# Patient Record
Sex: FEMALE | Race: BLACK OR AFRICAN AMERICAN | Employment: UNEMPLOYED | URBAN - METROPOLITAN AREA
[De-identification: names, ages, dates, MRNs, and addresses within clinical notes are randomized per-mention and may not be internally consistent; named-entity substitution may affect disease eponyms.]

---

## 2020-05-03 ENCOUNTER — HOSPITAL ENCOUNTER (EMERGENCY)
Age: 30
Discharge: HOME | End: 2020-05-03
Attending: EMERGENCY MEDICINE | Admitting: EMERGENCY MEDICINE
Payer: COMMERCIAL

## 2020-05-03 ENCOUNTER — APPOINTMENT (OUTPATIENT)
Dept: CT IMAGING | Age: 30
End: 2020-05-03
Attending: EMERGENCY MEDICINE
Payer: COMMERCIAL

## 2020-05-03 VITALS
RESPIRATION RATE: 16 BRPM | HEART RATE: 78 BPM | TEMPERATURE: 98.5 F | SYSTOLIC BLOOD PRESSURE: 165 MMHG | DIASTOLIC BLOOD PRESSURE: 76 MMHG | OXYGEN SATURATION: 100 %

## 2020-05-03 DIAGNOSIS — K80.20 GALL STONES: ICD-10-CM

## 2020-05-03 DIAGNOSIS — K80.50 BILIARY COLIC: Primary | ICD-10-CM

## 2020-05-03 DIAGNOSIS — N83.202 CYST OF LEFT OVARY: ICD-10-CM

## 2020-05-03 LAB
ALBUMIN SERPL-MCNC: 3.2 G/DL (ref 3.5–5)
ALBUMIN/GLOB SERPL: 0.6 {RATIO} (ref 1.1–2.2)
ALP SERPL-CCNC: 67 U/L (ref 45–117)
ALT SERPL-CCNC: 25 U/L (ref 12–78)
ANION GAP SERPL CALC-SCNC: 5 MMOL/L (ref 5–15)
APPEARANCE UR: CLEAR
AST SERPL-CCNC: 14 U/L (ref 15–37)
BACTERIA URNS QL MICRO: NEGATIVE /HPF
BASOPHILS # BLD: 0 K/UL (ref 0–0.1)
BASOPHILS NFR BLD: 0 % (ref 0–1)
BILIRUB SERPL-MCNC: 0.3 MG/DL (ref 0.2–1)
BILIRUB UR QL: NEGATIVE
BUN SERPL-MCNC: 8 MG/DL (ref 6–20)
BUN/CREAT SERPL: 11 (ref 12–20)
CALCIUM SERPL-MCNC: 8.7 MG/DL (ref 8.5–10.1)
CHLORIDE SERPL-SCNC: 102 MMOL/L (ref 97–108)
CO2 SERPL-SCNC: 26 MMOL/L (ref 21–32)
COLOR UR: ABNORMAL
COMMENT, HOLDF: NORMAL
CREAT SERPL-MCNC: 0.73 MG/DL (ref 0.55–1.02)
DIFFERENTIAL METHOD BLD: ABNORMAL
EOSINOPHIL # BLD: 0 K/UL (ref 0–0.4)
EOSINOPHIL NFR BLD: 0 % (ref 0–7)
EPITH CASTS URNS QL MICRO: ABNORMAL /LPF
ERYTHROCYTE [DISTWIDTH] IN BLOOD BY AUTOMATED COUNT: 13.5 % (ref 11.5–14.5)
GLOBULIN SER CALC-MCNC: 5.2 G/DL (ref 2–4)
GLUCOSE SERPL-MCNC: 119 MG/DL (ref 65–100)
GLUCOSE UR STRIP.AUTO-MCNC: NEGATIVE MG/DL
HCG UR QL: NEGATIVE
HCT VFR BLD AUTO: 32.8 % (ref 35–47)
HGB BLD-MCNC: 10.4 G/DL (ref 11.5–16)
HGB UR QL STRIP: NEGATIVE
HYALINE CASTS URNS QL MICRO: ABNORMAL /LPF (ref 0–5)
IMM GRANULOCYTES # BLD AUTO: 0 K/UL (ref 0–0.04)
IMM GRANULOCYTES NFR BLD AUTO: 0 % (ref 0–0.5)
KETONES UR QL STRIP.AUTO: ABNORMAL MG/DL
LEUKOCYTE ESTERASE UR QL STRIP.AUTO: NEGATIVE
LIPASE SERPL-CCNC: 88 U/L (ref 73–393)
LYMPHOCYTES # BLD: 1.2 K/UL (ref 0.8–3.5)
LYMPHOCYTES NFR BLD: 15 % (ref 12–49)
MCH RBC QN AUTO: 27.4 PG (ref 26–34)
MCHC RBC AUTO-ENTMCNC: 31.7 G/DL (ref 30–36.5)
MCV RBC AUTO: 86.3 FL (ref 80–99)
MONOCYTES # BLD: 0.3 K/UL (ref 0–1)
MONOCYTES NFR BLD: 4 % (ref 5–13)
NEUTS SEG # BLD: 6.6 K/UL (ref 1.8–8)
NEUTS SEG NFR BLD: 81 % (ref 32–75)
NITRITE UR QL STRIP.AUTO: NEGATIVE
NRBC # BLD: 0 K/UL (ref 0–0.01)
NRBC BLD-RTO: 0 PER 100 WBC
PH UR STRIP: >8.5 [PH] (ref 5–8)
PLATELET # BLD AUTO: 290 K/UL (ref 150–400)
PMV BLD AUTO: 9.9 FL (ref 8.9–12.9)
POTASSIUM SERPL-SCNC: 3.6 MMOL/L (ref 3.5–5.1)
PROT SERPL-MCNC: 8.4 G/DL (ref 6.4–8.2)
PROT UR STRIP-MCNC: 30 MG/DL
RBC # BLD AUTO: 3.8 M/UL (ref 3.8–5.2)
RBC #/AREA URNS HPF: ABNORMAL /HPF (ref 0–5)
SAMPLES BEING HELD,HOLD: NORMAL
SODIUM SERPL-SCNC: 133 MMOL/L (ref 136–145)
SP GR UR REFRACTOMETRY: 1.03 (ref 1–1.03)
UR CULT HOLD, URHOLD: NORMAL
UROBILINOGEN UR QL STRIP.AUTO: 1 EU/DL (ref 0.2–1)
WBC # BLD AUTO: 8.2 K/UL (ref 3.6–11)
WBC URNS QL MICRO: ABNORMAL /HPF (ref 0–4)

## 2020-05-03 PROCEDURE — 96374 THER/PROPH/DIAG INJ IV PUSH: CPT

## 2020-05-03 PROCEDURE — 36415 COLL VENOUS BLD VENIPUNCTURE: CPT

## 2020-05-03 PROCEDURE — 74011250637 HC RX REV CODE- 250/637: Performed by: EMERGENCY MEDICINE

## 2020-05-03 PROCEDURE — 99284 EMERGENCY DEPT VISIT MOD MDM: CPT

## 2020-05-03 PROCEDURE — 74011250636 HC RX REV CODE- 250/636: Performed by: EMERGENCY MEDICINE

## 2020-05-03 PROCEDURE — 74011000258 HC RX REV CODE- 258: Performed by: RADIOLOGY

## 2020-05-03 PROCEDURE — 96376 TX/PRO/DX INJ SAME DRUG ADON: CPT

## 2020-05-03 PROCEDURE — 74177 CT ABD & PELVIS W/CONTRAST: CPT

## 2020-05-03 PROCEDURE — 80053 COMPREHEN METABOLIC PANEL: CPT

## 2020-05-03 PROCEDURE — 85025 COMPLETE CBC W/AUTO DIFF WBC: CPT

## 2020-05-03 PROCEDURE — 74011636320 HC RX REV CODE- 636/320: Performed by: RADIOLOGY

## 2020-05-03 PROCEDURE — 81025 URINE PREGNANCY TEST: CPT

## 2020-05-03 PROCEDURE — 81001 URINALYSIS AUTO W/SCOPE: CPT

## 2020-05-03 PROCEDURE — 96375 TX/PRO/DX INJ NEW DRUG ADDON: CPT

## 2020-05-03 PROCEDURE — 83690 ASSAY OF LIPASE: CPT

## 2020-05-03 RX ORDER — ONDANSETRON 4 MG/1
4 TABLET, ORALLY DISINTEGRATING ORAL
Qty: 20 TAB | Refills: 0 | Status: SHIPPED | OUTPATIENT
Start: 2020-05-03

## 2020-05-03 RX ORDER — HYDROCODONE BITARTRATE AND ACETAMINOPHEN 5; 325 MG/1; MG/1
1 TABLET ORAL
Qty: 18 TAB | Refills: 0 | Status: SHIPPED | OUTPATIENT
Start: 2020-05-03 | End: 2020-05-06

## 2020-05-03 RX ORDER — SODIUM CHLORIDE 0.9 % (FLUSH) 0.9 %
10 SYRINGE (ML) INJECTION
Status: COMPLETED | OUTPATIENT
Start: 2020-05-03 | End: 2020-05-03

## 2020-05-03 RX ORDER — ONDANSETRON 2 MG/ML
4 INJECTION INTRAMUSCULAR; INTRAVENOUS
Status: COMPLETED | OUTPATIENT
Start: 2020-05-03 | End: 2020-05-03

## 2020-05-03 RX ORDER — SODIUM CHLORIDE 0.9 % (FLUSH) 0.9 %
5-40 SYRINGE (ML) INJECTION EVERY 8 HOURS
Status: DISCONTINUED | OUTPATIENT
Start: 2020-05-03 | End: 2020-05-03 | Stop reason: HOSPADM

## 2020-05-03 RX ORDER — SODIUM CHLORIDE 0.9 % (FLUSH) 0.9 %
5-40 SYRINGE (ML) INJECTION AS NEEDED
Status: DISCONTINUED | OUTPATIENT
Start: 2020-05-03 | End: 2020-05-03 | Stop reason: HOSPADM

## 2020-05-03 RX ORDER — HYDROCODONE BITARTRATE AND ACETAMINOPHEN 5; 325 MG/1; MG/1
1 TABLET ORAL ONCE
Status: COMPLETED | OUTPATIENT
Start: 2020-05-03 | End: 2020-05-03

## 2020-05-03 RX ORDER — FENTANYL CITRATE 50 UG/ML
50 INJECTION, SOLUTION INTRAMUSCULAR; INTRAVENOUS ONCE
Status: COMPLETED | OUTPATIENT
Start: 2020-05-03 | End: 2020-05-03

## 2020-05-03 RX ADMIN — SODIUM CHLORIDE 100 ML: 900 INJECTION, SOLUTION INTRAVENOUS at 07:18

## 2020-05-03 RX ADMIN — IOPAMIDOL 100 ML: 755 INJECTION, SOLUTION INTRAVENOUS at 07:18

## 2020-05-03 RX ADMIN — HYDROCODONE BITARTRATE AND ACETAMINOPHEN 1 TABLET: 5; 325 TABLET ORAL at 08:26

## 2020-05-03 RX ADMIN — ONDANSETRON 4 MG: 2 INJECTION INTRAMUSCULAR; INTRAVENOUS at 06:47

## 2020-05-03 RX ADMIN — ONDANSETRON 4 MG: 2 INJECTION INTRAMUSCULAR; INTRAVENOUS at 08:26

## 2020-05-03 RX ADMIN — Medication 10 ML: at 07:18

## 2020-05-03 RX ADMIN — FENTANYL CITRATE 50 MCG: 50 INJECTION INTRAMUSCULAR; INTRAVENOUS at 06:47

## 2020-05-03 NOTE — ED TRIAGE NOTES
Triage: Pt arrives from home with CC of abdominal pain in the center of her stomach. She reports the pain is 10/10 and describes it as a knot. Pt endorses some nausea and vomiting. Pt also reports that she has hasn't had her period in over a month and could potentially be pregnant. Pt took tylenol and ibuprofen for pain without relief. Denies fever and cough.

## 2020-05-03 NOTE — ED PROVIDER NOTES
Healthy. She presents with an 8-hour history of diffuse abdominal pain that is worse in the epigastrium. It has been constant but waxes and wanes in intensity. She states that it radiates somewhat to her back. She has had similar but milder pain in the past that has lasted only for a couple hours at a time. She has had nausea and vomiting x2. No diarrhea. She states that her last bowel movement was last night. No black stools or bright red blood per rectum. She tried Tylenol, ibuprofen, and Pepto-Bismol without relief. She states that her only surgical history is 2 C-sections in the past.           No past medical history on file. No past surgical history on file. No family history on file.     Social History     Socioeconomic History    Marital status:      Spouse name: Not on file    Number of children: Not on file    Years of education: Not on file    Highest education level: Not on file   Occupational History    Not on file   Social Needs    Financial resource strain: Not on file    Food insecurity     Worry: Not on file     Inability: Not on file    Transportation needs     Medical: Not on file     Non-medical: Not on file   Tobacco Use    Smoking status: Not on file   Substance and Sexual Activity    Alcohol use: Not on file    Drug use: Not on file    Sexual activity: Not on file   Lifestyle    Physical activity     Days per week: Not on file     Minutes per session: Not on file    Stress: Not on file   Relationships    Social connections     Talks on phone: Not on file     Gets together: Not on file     Attends Mandaeism service: Not on file     Active member of club or organization: Not on file     Attends meetings of clubs or organizations: Not on file     Relationship status: Not on file    Intimate partner violence     Fear of current or ex partner: Not on file     Emotionally abused: Not on file     Physically abused: Not on file     Forced sexual activity: Not on file   Other Topics Concern    Not on file   Social History Narrative    Not on file         ALLERGIES: Patient has no known allergies. Review of Systems   All other systems reviewed and are negative. Vitals:    05/03/20 0533   BP: 156/64   Pulse: 78   Resp: 16   Temp: 97.9 °F (36.6 °C)   SpO2: 99%            Physical Exam  Vitals signs and nursing note reviewed. Constitutional:       Appearance: She is well-developed. Comments: Obese. Appears uncomfortable. HENT:      Head: Normocephalic and atraumatic. Eyes:      Conjunctiva/sclera: Conjunctivae normal.   Neck:      Musculoskeletal: Neck supple. Trachea: No tracheal deviation. Cardiovascular:      Rate and Rhythm: Normal rate and regular rhythm. Heart sounds: Normal heart sounds. No murmur. No friction rub. No gallop. Pulmonary:      Effort: Pulmonary effort is normal.      Breath sounds: Normal breath sounds. Abdominal:      Palpations: Abdomen is soft. Comments: Epigastric tenderness. Musculoskeletal:         General: No deformity. Skin:     General: Skin is warm and dry. Neurological:      Mental Status: She is alert. Comments: oriented          MDM       Procedures    Assessment/plan: Presents with an 8-hour history of diffuse abdominal pain (worse in the epigastrium). Differential diagnosis includes GERD, gastritis, biliary colic, small bowel obstruction, pancreatitis. I have ordered labs, CT, IV fluids, fentanyl, and Zofran. Delvin Kennedy MD  6:37 AM    7:05 AM  Change of shift. Care of patient signed over to Dr. Emily Segura. Handoff complete. Awaiting CT/reassessment.    Delvin Kennedy MD

## 2020-05-03 NOTE — ED NOTES
Discharge instructions and prescriptions x2 reviewed with patient. Circumstances to return to ED and follow up reviewed. Patient verbalized understanding. Patient AAO x4, ambulatory to waiting room. Patient stable and safe for discharge.

## 2020-05-03 NOTE — ED NOTES
Care assumed from Dr. Encarnacion Core at 7 AM.  Please see his note for full H&P.  49-year-old female with epigastric abdominal pain that reportedly has been waxing and waning intermittently but returned more severe last night. Patient is afebrile with vital signs stable but uncomfortable appearing, reportedly. Labs returned showing no significant abnormalities. UA negative for infection or hematuria. CT abdomen pelvis pending at time of signout. CT returned showing cholelithiasis. Feel this is likely the etiology for her symptoms with biliary colic. Also noted to have a 5.6 cm left ovarian cyst which she was notified of and recommended OB/GYN follow-up for. No pain lower abdomen currently. She was given a dose of Norco and Zofran in the ED prior to discharge for symptomatic relief and was Rx the same. She was recommended close follow-up with general surgery for further evaluation of her symptoms and to arrange for likely outpatient cholecystectomy. She was advised low-fat diet. Return precautions were given for worsening or concerns with specific emphasis placed on signs of infection.

## 2020-05-04 ENCOUNTER — PATIENT OUTREACH (OUTPATIENT)
Dept: CASE MANAGEMENT | Age: 30
End: 2020-05-04

## 2020-05-04 ENCOUNTER — OFFICE VISIT (OUTPATIENT)
Dept: SURGERY | Age: 30
End: 2020-05-04

## 2020-05-04 VITALS
WEIGHT: 293 LBS | BODY MASS INDEX: 47.09 KG/M2 | RESPIRATION RATE: 20 BRPM | HEIGHT: 66 IN | DIASTOLIC BLOOD PRESSURE: 84 MMHG | OXYGEN SATURATION: 94 % | HEART RATE: 113 BPM | SYSTOLIC BLOOD PRESSURE: 133 MMHG | TEMPERATURE: 98.5 F

## 2020-05-04 DIAGNOSIS — K80.20 CALCULUS OF GALLBLADDER WITHOUT CHOLECYSTITIS WITHOUT OBSTRUCTION: Primary | ICD-10-CM

## 2020-05-04 PROBLEM — E66.01 OBESITY, MORBID (HCC): Status: ACTIVE | Noted: 2020-05-04

## 2020-05-04 NOTE — PROGRESS NOTES
Patient contacted regarding recent discharge and COVID-19 risk   Care Transition Nurse/ Ambulatory Care Manager contacted the patient by telephone to perform post discharge assessment. Verified name and  with patient as identifiers. Patient has following risk factors of: no known risk factors. CTN/ACM reviewed discharge instructions, medical action plan and red flags related to discharge diagnosis. Reviewed and educated them on any new and changed medications related to discharge diagnosis. Advised obtaining a 90-day supply of all daily and as-needed medications. Education provided regarding infection prevention, and signs and symptoms of COVID-19 and when to seek medical attention with patient who verbalized understanding. Discussed exposure protocols and quarantine from [address] you at higher risk for severe illness  and given an opportunity for questions and concerns. The patient agrees to contact the COVID-19 hotline [phone] or PCP office for questions related to their healthcare. CTN/ACM provided contact information for future reference. From CDC: Are you at higher risk for severe illness?  Wash your hands often.  Avoid close contact (6 feet, which is about two arm lengths) with people who are sick.  Put distance between yourself and other people if COVID-19 is spreading in your community.  Clean and disinfect frequently touched surfaces.  Avoid all cruise travel and non-essential air travel.  Call your healthcare professional if you have concerns about COVID-19 and your underlying condition or if you are sick.     For more information on steps you can take to protect yourself, see CDC's How to Protect Yourself      Patient/family/caregiver given information for [facility] and agrees to enroll no  Patient's preferred e-mail:  declined  Patient's preferred phone number: declined  Based on Loop alert triggers, patient will be contacted by nurse care manager for worsening symptoms. Plan for follow-up call in 7-14 days based on severity of symptoms and risk factors. Patient reports having ongoing pain in abdomen and nausea. Patient reports no further vomiting since dc'd home but she has only eaten a few crackers and has been drinking juice and water. Patient reports taking the pain medication every 4 hours and still having a lot of pain and unable to sleep longer than 15-20 mins at a time due to pain. Patient was able to schedule with Dr Georgia Nielsen today at 3:40 pm. Patient with questions about the gallbladder, provided that it is a relatively simple surgery usually done laparoscopically with a relatively quick recovery time. Patient notes concern over ovarian cyst and whether it will need to be removed. Recommended she could mention to Dr Georgia Nielsen and request his opinion at appt but will most likely need to follow up with GYN regarding this. At this time the primary issue to address would be the gallstones. COVID teaching provided. All questions answered.

## 2020-05-04 NOTE — PROGRESS NOTES
Kimberly Morillo is a 30 y.o. female who is referred by the [facility] ER for further evaluation of symptomatic cholelithiasis. Ms. Carmelita Phelps tells me that she began experiencing abdominal pain on May 3, 2020. Pain localized to epigastrium. No clear relationship between abdominal pain and meals. Associated nausea and vomitting. No hematemesis or coffee ground emesis. No fevers but she did experience chills. No clear h/o clarisse colored stool or tea colored urine. Ms. Carmelita Phelps also reports chest pain and shortness of breath. She has otherwise been in her usual state of health. CT scan abdomen/pelvis with IV contrast - 5/3/2020 - Cholelithiasis. No acute abnormality. 5.6 cm left ovarian cyst.    Past Medical History:   Diagnosis Date    Calculus of gallbladder without cholecystitis without obstruction 5/4/2020     History reviewed. No pertinent surgical history. History reviewed. No pertinent family history. Social History     Socioeconomic History    Marital status: UNKNOWN     Spouse name: Not on file    Number of children: Not on file    Years of education: Not on file    Highest education level: Not on file     Review of systems negative except as noted. Review of Systems   Respiratory: Negative for shortness of breath. Cardiovascular: Negative for chest pain. Gastrointestinal: Positive for abdominal pain, nausea and vomiting. No clear h/o clarisse colored stool. Genitourinary:        No clear h/o tea colored urine. Physical Exam  Vitals signs reviewed. Constitutional:       General: She is not in acute distress. Appearance: She is obese. HENT:      Head: Normocephalic and atraumatic. Eyes:      General: No scleral icterus. Neck:      Musculoskeletal: Neck supple. Cardiovascular:      Rate and Rhythm: Normal rate and regular rhythm. Pulmonary:      Effort: Pulmonary effort is normal.      Breath sounds: Normal breath sounds.    Abdominal:      General: There is no distension. Palpations: Abdomen is soft. Tenderness: There is no abdominal tenderness. There is no guarding or rebound. Musculoskeletal: Normal range of motion. Lymphadenopathy:      Cervical: No cervical adenopathy. Neurological:      General: No focal deficit present. Mental Status: She is alert. ASSESSMENT and PLAN  Reviewed CT scan. Ms. Nadira Perez is a 30 y.o. female with symptomatic cholelithiasis. In view of the findings on History and Physical examination as well as the imaging studies, she should benefit from cholecystectomy. I discussed laparoscopic cholecystectomy and intra-operative cholangiogram with her today including the potential risks of bleeding, infection, injury to the common bile duct and conversion to an open procedure. She understands and wishes to proceed. I have tentatively scheduled Ms. Lackey for laparoscopic cholecystectomy and intra-operative cholangiogram on May 8, 2020 at [facility]. I will keep herovernight for observation and see her back in the office post-operatively. Norco 5/325 as needed for pain. She is agreeable to this plan of action and is most certainly free to contact the office should any questions or concerns arise.

## 2020-05-05 ENCOUNTER — HOSPITAL ENCOUNTER (OUTPATIENT)
Dept: LAB | Age: 30
Discharge: HOME | End: 2020-05-05

## 2020-05-05 ENCOUNTER — HOSPITAL ENCOUNTER (OUTPATIENT)
Dept: PREADMISSION TESTING | Age: 30
Discharge: HOME | End: 2020-05-05
Payer: COMMERCIAL

## 2020-05-05 VITALS
DIASTOLIC BLOOD PRESSURE: 79 MMHG | RESPIRATION RATE: 18 BRPM | TEMPERATURE: 99 F | HEIGHT: 63 IN | HEART RATE: 87 BPM | BODY MASS INDEX: 51.91 KG/M2 | WEIGHT: 293 LBS | SYSTOLIC BLOOD PRESSURE: 121 MMHG

## 2020-05-05 PROCEDURE — 87635 SARS-COV-2 COVID-19 AMP PRB: CPT

## 2020-05-05 RX ORDER — ACETAMINOPHEN 325 MG/1
1000 TABLET ORAL ONCE
Status: CANCELLED | OUTPATIENT
Start: 2020-05-05 | End: 2020-05-05

## 2020-05-05 RX ORDER — BUPIVACAINE HYDROCHLORIDE 2.5 MG/ML
30 INJECTION, SOLUTION EPIDURAL; INFILTRATION; INTRACAUDAL ONCE
Status: CANCELLED | OUTPATIENT
Start: 2020-05-05 | End: 2020-05-05

## 2020-05-05 NOTE — PERIOP NOTES
Preoperative and medications  instructions reviewed with patient. Patient given  2 bottles of CHG soap. Instructions reviewed on use of CHG soap. Patient given SSI infection FAQS sheet Patient was given the opportunity to ask questions on the information provided.

## 2020-05-06 LAB — SARS-COV-2, COV2NT: NOT DETECTED

## 2020-05-07 ENCOUNTER — ANESTHESIA EVENT (OUTPATIENT)
Dept: SURGERY | Age: 30
End: 2020-05-07
Payer: COMMERCIAL

## 2020-05-07 ENCOUNTER — HOSPITAL ENCOUNTER (OUTPATIENT)
Dept: PREADMISSION TESTING | Age: 30
Discharge: HOME | End: 2020-05-07
Payer: COMMERCIAL

## 2020-05-07 LAB
ATRIAL RATE: 65 BPM
CALCULATED P AXIS, ECG09: 19 DEGREES
CALCULATED R AXIS, ECG10: 16 DEGREES
CALCULATED T AXIS, ECG11: 7 DEGREES
DIAGNOSIS, 93000: NORMAL
P-R INTERVAL, ECG05: 170 MS
Q-T INTERVAL, ECG07: 376 MS
QRS DURATION, ECG06: 94 MS
QTC CALCULATION (BEZET), ECG08: 391 MS
VENTRICULAR RATE, ECG03: 65 BPM

## 2020-05-07 PROCEDURE — 93005 ELECTROCARDIOGRAM TRACING: CPT

## 2020-05-08 ENCOUNTER — APPOINTMENT (OUTPATIENT)
Dept: GENERAL RADIOLOGY | Age: 30
End: 2020-05-08
Attending: ANESTHESIOLOGY
Payer: COMMERCIAL

## 2020-05-08 ENCOUNTER — HOSPITAL ENCOUNTER (OUTPATIENT)
Age: 30
Setting detail: OBSERVATION
Discharge: HOME | End: 2020-05-09
Attending: SURGERY | Admitting: SURGERY
Payer: COMMERCIAL

## 2020-05-08 ENCOUNTER — ANESTHESIA (OUTPATIENT)
Dept: SURGERY | Age: 30
End: 2020-05-08
Payer: COMMERCIAL

## 2020-05-08 DIAGNOSIS — K80.20 CALCULUS OF GALLBLADDER WITHOUT CHOLECYSTITIS WITHOUT OBSTRUCTION: Primary | ICD-10-CM

## 2020-05-08 LAB — HCG UR QL: NEGATIVE

## 2020-05-08 PROCEDURE — 77030008684 HC TU ET CUF COVD -B: Performed by: ANESTHESIOLOGY

## 2020-05-08 PROCEDURE — 77030020263 HC SOL INJ SOD CL0.9% LFCR 1000ML: Performed by: SURGERY

## 2020-05-08 PROCEDURE — 77030037032 HC INSRT SCIS CLICKLLINE DISP STOR -B: Performed by: SURGERY

## 2020-05-08 PROCEDURE — 74011000250 HC RX REV CODE- 250: Performed by: NURSE ANESTHETIST, CERTIFIED REGISTERED

## 2020-05-08 PROCEDURE — 77030008771 HC TU NG SALEM SUMP -A: Performed by: ANESTHESIOLOGY

## 2020-05-08 PROCEDURE — 74011250636 HC RX REV CODE- 250/636: Performed by: ANESTHESIOLOGY

## 2020-05-08 PROCEDURE — 99218 HC RM OBSERVATION: CPT

## 2020-05-08 PROCEDURE — 76210000003 HC OR PH I REC 3.5 TO 4 HR: Performed by: SURGERY

## 2020-05-08 PROCEDURE — 76060000037 HC ANESTHESIA 3 TO 3.5 HR: Performed by: SURGERY

## 2020-05-08 PROCEDURE — 74011000250 HC RX REV CODE- 250: Performed by: SURGERY

## 2020-05-08 PROCEDURE — 77030020053 HC ELECTRD LAPSCP COVD -B: Performed by: SURGERY

## 2020-05-08 PROCEDURE — 77030040361 HC SLV COMPR DVT MDII -B: Performed by: SURGERY

## 2020-05-08 PROCEDURE — 77030013079 HC BLNKT BAIR HGGR 3M -A: Performed by: ANESTHESIOLOGY

## 2020-05-08 PROCEDURE — 77030010507 HC ADH SKN DERMBND J&J -B: Performed by: SURGERY

## 2020-05-08 PROCEDURE — 77030008756 HC TU IRR SUC STRY -B: Performed by: SURGERY

## 2020-05-08 PROCEDURE — 77030012770 HC TRCR OPT FX AMR -B: Performed by: SURGERY

## 2020-05-08 PROCEDURE — 77030018875 HC APPL CLP LIG4 J&J -B: Performed by: SURGERY

## 2020-05-08 PROCEDURE — 74011250637 HC RX REV CODE- 250/637: Performed by: SURGERY

## 2020-05-08 PROCEDURE — 51798 US URINE CAPACITY MEASURE: CPT

## 2020-05-08 PROCEDURE — 77030026438 HC STYL ET INTUB CARD -A: Performed by: ANESTHESIOLOGY

## 2020-05-08 PROCEDURE — 77030011640 HC PAD GRND REM COVD -A: Performed by: SURGERY

## 2020-05-08 PROCEDURE — 77030040922 HC BLNKT HYPOTHRM STRY -A

## 2020-05-08 PROCEDURE — 77030020704 HC DISECT ENDOSC BLNT J&J -B: Performed by: SURGERY

## 2020-05-08 PROCEDURE — 77030031139 HC SUT VCRL2 J&J -A: Performed by: SURGERY

## 2020-05-08 PROCEDURE — 74011250636 HC RX REV CODE- 250/636: Performed by: SURGERY

## 2020-05-08 PROCEDURE — 77030008608 HC TRCR ENDOSC SMTH AMR -B: Performed by: SURGERY

## 2020-05-08 PROCEDURE — 77030007955 HC PCH ENDOSC SPEC J&J -B: Performed by: SURGERY

## 2020-05-08 PROCEDURE — 77030027743 HC APPL F/HEMSTAT BARD -B: Performed by: SURGERY

## 2020-05-08 PROCEDURE — 71045 X-RAY EXAM CHEST 1 VIEW: CPT

## 2020-05-08 PROCEDURE — 77030002933 HC SUT MCRYL J&J -A: Performed by: SURGERY

## 2020-05-08 PROCEDURE — 88304 TISSUE EXAM BY PATHOLOGIST: CPT

## 2020-05-08 PROCEDURE — 81025 URINE PREGNANCY TEST: CPT

## 2020-05-08 PROCEDURE — 65410000002 HC RM PRIVATE OB

## 2020-05-08 PROCEDURE — 74011250636 HC RX REV CODE- 250/636: Performed by: NURSE ANESTHETIST, CERTIFIED REGISTERED

## 2020-05-08 PROCEDURE — 76010000133 HC OR TIME 3 TO 3.5 HR: Performed by: SURGERY

## 2020-05-08 PROCEDURE — 77030032060 HC PWDR HEMSTAT ARISTA ASRB 3GM BARD -C: Performed by: SURGERY

## 2020-05-08 PROCEDURE — 77030020829: Performed by: SURGERY

## 2020-05-08 PROCEDURE — 74011000258 HC RX REV CODE- 258: Performed by: SURGERY

## 2020-05-08 RX ORDER — KETOROLAC TROMETHAMINE 30 MG/ML
INJECTION, SOLUTION INTRAMUSCULAR; INTRAVENOUS AS NEEDED
Status: DISCONTINUED | OUTPATIENT
Start: 2020-05-08 | End: 2020-05-08 | Stop reason: HOSPADM

## 2020-05-08 RX ORDER — DEXAMETHASONE SODIUM PHOSPHATE 4 MG/ML
INJECTION, SOLUTION INTRA-ARTICULAR; INTRALESIONAL; INTRAMUSCULAR; INTRAVENOUS; SOFT TISSUE AS NEEDED
Status: DISCONTINUED | OUTPATIENT
Start: 2020-05-08 | End: 2020-05-08 | Stop reason: HOSPADM

## 2020-05-08 RX ORDER — DIPHENHYDRAMINE HYDROCHLORIDE 50 MG/ML
12.5 INJECTION, SOLUTION INTRAMUSCULAR; INTRAVENOUS AS NEEDED
Status: DISCONTINUED | OUTPATIENT
Start: 2020-05-08 | End: 2020-05-08 | Stop reason: HOSPADM

## 2020-05-08 RX ORDER — SODIUM CHLORIDE 0.9 % (FLUSH) 0.9 %
5-40 SYRINGE (ML) INJECTION AS NEEDED
Status: DISCONTINUED | OUTPATIENT
Start: 2020-05-08 | End: 2020-05-09 | Stop reason: HOSPADM

## 2020-05-08 RX ORDER — ACETAMINOPHEN 325 MG/1
650 TABLET ORAL ONCE
Status: DISCONTINUED | OUTPATIENT
Start: 2020-05-08 | End: 2020-05-08 | Stop reason: HOSPADM

## 2020-05-08 RX ORDER — FENTANYL CITRATE 50 UG/ML
25 INJECTION, SOLUTION INTRAMUSCULAR; INTRAVENOUS
Status: COMPLETED | OUTPATIENT
Start: 2020-05-08 | End: 2020-05-08

## 2020-05-08 RX ORDER — ACETAMINOPHEN 500 MG
1000 TABLET ORAL ONCE
Status: COMPLETED | OUTPATIENT
Start: 2020-05-08 | End: 2020-05-08

## 2020-05-08 RX ORDER — ONDANSETRON 2 MG/ML
INJECTION INTRAMUSCULAR; INTRAVENOUS AS NEEDED
Status: DISCONTINUED | OUTPATIENT
Start: 2020-05-08 | End: 2020-05-08 | Stop reason: HOSPADM

## 2020-05-08 RX ORDER — ONDANSETRON 4 MG/1
4 TABLET, ORALLY DISINTEGRATING ORAL
Status: DISCONTINUED | OUTPATIENT
Start: 2020-05-08 | End: 2020-05-09 | Stop reason: HOSPADM

## 2020-05-08 RX ORDER — DIPHENHYDRAMINE HCL 25 MG
25 CAPSULE ORAL
Status: DISCONTINUED | OUTPATIENT
Start: 2020-05-08 | End: 2020-05-09 | Stop reason: HOSPADM

## 2020-05-08 RX ORDER — SODIUM CHLORIDE 0.9 % (FLUSH) 0.9 %
5-40 SYRINGE (ML) INJECTION AS NEEDED
Status: DISCONTINUED | OUTPATIENT
Start: 2020-05-08 | End: 2020-05-08 | Stop reason: HOSPADM

## 2020-05-08 RX ORDER — OXYCODONE HYDROCHLORIDE 5 MG/1
5 TABLET ORAL
Qty: 12 TAB | Refills: 0 | Status: SHIPPED | OUTPATIENT
Start: 2020-05-08 | End: 2020-05-11

## 2020-05-08 RX ORDER — SODIUM CHLORIDE 0.9 % (FLUSH) 0.9 %
5-40 SYRINGE (ML) INJECTION EVERY 8 HOURS
Status: DISCONTINUED | OUTPATIENT
Start: 2020-05-08 | End: 2020-05-08 | Stop reason: HOSPADM

## 2020-05-08 RX ORDER — SODIUM CHLORIDE 0.9 % (FLUSH) 0.9 %
5-40 SYRINGE (ML) INJECTION EVERY 8 HOURS
Status: DISCONTINUED | OUTPATIENT
Start: 2020-05-08 | End: 2020-05-09 | Stop reason: HOSPADM

## 2020-05-08 RX ORDER — SODIUM CHLORIDE, SODIUM LACTATE, POTASSIUM CHLORIDE, CALCIUM CHLORIDE 600; 310; 30; 20 MG/100ML; MG/100ML; MG/100ML; MG/100ML
INJECTION, SOLUTION INTRAVENOUS
Status: DISCONTINUED | OUTPATIENT
Start: 2020-05-08 | End: 2020-05-08 | Stop reason: HOSPADM

## 2020-05-08 RX ORDER — FENTANYL CITRATE 50 UG/ML
50 INJECTION, SOLUTION INTRAMUSCULAR; INTRAVENOUS AS NEEDED
Status: DISCONTINUED | OUTPATIENT
Start: 2020-05-08 | End: 2020-05-08 | Stop reason: HOSPADM

## 2020-05-08 RX ORDER — SUCCINYLCHOLINE CHLORIDE 20 MG/ML
INJECTION INTRAMUSCULAR; INTRAVENOUS AS NEEDED
Status: DISCONTINUED | OUTPATIENT
Start: 2020-05-08 | End: 2020-05-08 | Stop reason: HOSPADM

## 2020-05-08 RX ORDER — MIDAZOLAM HYDROCHLORIDE 1 MG/ML
1 INJECTION, SOLUTION INTRAMUSCULAR; INTRAVENOUS AS NEEDED
Status: DISCONTINUED | OUTPATIENT
Start: 2020-05-08 | End: 2020-05-08 | Stop reason: HOSPADM

## 2020-05-08 RX ORDER — ROCURONIUM BROMIDE 10 MG/ML
INJECTION, SOLUTION INTRAVENOUS AS NEEDED
Status: DISCONTINUED | OUTPATIENT
Start: 2020-05-08 | End: 2020-05-08 | Stop reason: HOSPADM

## 2020-05-08 RX ORDER — ONDANSETRON 2 MG/ML
4 INJECTION INTRAMUSCULAR; INTRAVENOUS AS NEEDED
Status: DISCONTINUED | OUTPATIENT
Start: 2020-05-08 | End: 2020-05-08 | Stop reason: HOSPADM

## 2020-05-08 RX ORDER — SODIUM CHLORIDE 9 MG/ML
25 INJECTION, SOLUTION INTRAVENOUS CONTINUOUS
Status: DISCONTINUED | OUTPATIENT
Start: 2020-05-08 | End: 2020-05-08 | Stop reason: HOSPADM

## 2020-05-08 RX ORDER — SODIUM CHLORIDE, SODIUM LACTATE, POTASSIUM CHLORIDE, CALCIUM CHLORIDE 600; 310; 30; 20 MG/100ML; MG/100ML; MG/100ML; MG/100ML
75 INJECTION, SOLUTION INTRAVENOUS CONTINUOUS
Status: DISCONTINUED | OUTPATIENT
Start: 2020-05-08 | End: 2020-05-08 | Stop reason: HOSPADM

## 2020-05-08 RX ORDER — LIDOCAINE HYDROCHLORIDE 20 MG/ML
INJECTION, SOLUTION EPIDURAL; INFILTRATION; INTRACAUDAL; PERINEURAL AS NEEDED
Status: DISCONTINUED | OUTPATIENT
Start: 2020-05-08 | End: 2020-05-08 | Stop reason: HOSPADM

## 2020-05-08 RX ORDER — MIDAZOLAM HYDROCHLORIDE 1 MG/ML
0.5 INJECTION, SOLUTION INTRAMUSCULAR; INTRAVENOUS
Status: DISCONTINUED | OUTPATIENT
Start: 2020-05-08 | End: 2020-05-08 | Stop reason: HOSPADM

## 2020-05-08 RX ORDER — OXYCODONE HYDROCHLORIDE 5 MG/1
5 TABLET ORAL
Status: DISCONTINUED | OUTPATIENT
Start: 2020-05-08 | End: 2020-05-09 | Stop reason: HOSPADM

## 2020-05-08 RX ORDER — BUPIVACAINE HYDROCHLORIDE 2.5 MG/ML
30 INJECTION, SOLUTION EPIDURAL; INFILTRATION; INTRACAUDAL ONCE
Status: COMPLETED | OUTPATIENT
Start: 2020-05-08 | End: 2020-05-08

## 2020-05-08 RX ORDER — MIDAZOLAM HYDROCHLORIDE 1 MG/ML
INJECTION, SOLUTION INTRAMUSCULAR; INTRAVENOUS AS NEEDED
Status: DISCONTINUED | OUTPATIENT
Start: 2020-05-08 | End: 2020-05-08 | Stop reason: HOSPADM

## 2020-05-08 RX ORDER — FENTANYL CITRATE 50 UG/ML
INJECTION, SOLUTION INTRAMUSCULAR; INTRAVENOUS AS NEEDED
Status: DISCONTINUED | OUTPATIENT
Start: 2020-05-08 | End: 2020-05-08 | Stop reason: HOSPADM

## 2020-05-08 RX ORDER — SODIUM CHLORIDE, SODIUM LACTATE, POTASSIUM CHLORIDE, CALCIUM CHLORIDE 600; 310; 30; 20 MG/100ML; MG/100ML; MG/100ML; MG/100ML
100 INJECTION, SOLUTION INTRAVENOUS CONTINUOUS
Status: DISCONTINUED | OUTPATIENT
Start: 2020-05-08 | End: 2020-05-09 | Stop reason: HOSPADM

## 2020-05-08 RX ORDER — LIDOCAINE HYDROCHLORIDE 10 MG/ML
0.1 INJECTION, SOLUTION EPIDURAL; INFILTRATION; INTRACAUDAL; PERINEURAL AS NEEDED
Status: DISCONTINUED | OUTPATIENT
Start: 2020-05-08 | End: 2020-05-08 | Stop reason: HOSPADM

## 2020-05-08 RX ORDER — MORPHINE SULFATE 10 MG/ML
2 INJECTION, SOLUTION INTRAMUSCULAR; INTRAVENOUS
Status: DISCONTINUED | OUTPATIENT
Start: 2020-05-08 | End: 2020-05-08 | Stop reason: HOSPADM

## 2020-05-08 RX ORDER — ACETAMINOPHEN 500 MG
1000 TABLET ORAL
Status: DISCONTINUED | OUTPATIENT
Start: 2020-05-08 | End: 2020-05-09 | Stop reason: HOSPADM

## 2020-05-08 RX ORDER — HEPARIN SODIUM 5000 [USP'U]/ML
5000 INJECTION, SOLUTION INTRAVENOUS; SUBCUTANEOUS EVERY 8 HOURS
Status: DISCONTINUED | OUTPATIENT
Start: 2020-05-08 | End: 2020-05-09 | Stop reason: HOSPADM

## 2020-05-08 RX ORDER — ROPIVACAINE HYDROCHLORIDE 5 MG/ML
30 INJECTION, SOLUTION EPIDURAL; INFILTRATION; PERINEURAL ONCE
Status: DISCONTINUED | OUTPATIENT
Start: 2020-05-08 | End: 2020-05-08 | Stop reason: HOSPADM

## 2020-05-08 RX ORDER — OXYCODONE HYDROCHLORIDE 5 MG/1
10 TABLET ORAL
Status: DISCONTINUED | OUTPATIENT
Start: 2020-05-08 | End: 2020-05-09 | Stop reason: HOSPADM

## 2020-05-08 RX ORDER — ESMOLOL HYDROCHLORIDE 10 MG/ML
INJECTION INTRAVENOUS AS NEEDED
Status: DISCONTINUED | OUTPATIENT
Start: 2020-05-08 | End: 2020-05-08 | Stop reason: HOSPADM

## 2020-05-08 RX ORDER — HYDROMORPHONE HYDROCHLORIDE 1 MG/ML
0.2 INJECTION, SOLUTION INTRAMUSCULAR; INTRAVENOUS; SUBCUTANEOUS
Status: DISCONTINUED | OUTPATIENT
Start: 2020-05-08 | End: 2020-05-08 | Stop reason: HOSPADM

## 2020-05-08 RX ORDER — SODIUM CHLORIDE, SODIUM LACTATE, POTASSIUM CHLORIDE, CALCIUM CHLORIDE 600; 310; 30; 20 MG/100ML; MG/100ML; MG/100ML; MG/100ML
125 INJECTION, SOLUTION INTRAVENOUS CONTINUOUS
Status: DISCONTINUED | OUTPATIENT
Start: 2020-05-08 | End: 2020-05-08 | Stop reason: HOSPADM

## 2020-05-08 RX ORDER — PROPOFOL 10 MG/ML
INJECTION, EMULSION INTRAVENOUS AS NEEDED
Status: DISCONTINUED | OUTPATIENT
Start: 2020-05-08 | End: 2020-05-08 | Stop reason: HOSPADM

## 2020-05-08 RX ADMIN — ROCURONIUM BROMIDE 30 MG: 10 SOLUTION INTRAVENOUS at 09:28

## 2020-05-08 RX ADMIN — FENTANYL CITRATE 25 MCG: 50 INJECTION INTRAMUSCULAR; INTRAVENOUS at 11:04

## 2020-05-08 RX ADMIN — SODIUM CHLORIDE, POTASSIUM CHLORIDE, SODIUM LACTATE AND CALCIUM CHLORIDE: 600; 310; 30; 20 INJECTION, SOLUTION INTRAVENOUS at 09:16

## 2020-05-08 RX ADMIN — DEXAMETHASONE SODIUM PHOSPHATE 8 MG: 4 INJECTION, SOLUTION INTRAMUSCULAR; INTRAVENOUS at 08:00

## 2020-05-08 RX ADMIN — SODIUM CHLORIDE, SODIUM LACTATE, POTASSIUM CHLORIDE, AND CALCIUM CHLORIDE 100 ML/HR: 600; 310; 30; 20 INJECTION, SOLUTION INTRAVENOUS at 20:24

## 2020-05-08 RX ADMIN — HEPARIN SODIUM 5000 UNITS: 5000 INJECTION INTRAVENOUS; SUBCUTANEOUS at 22:39

## 2020-05-08 RX ADMIN — SODIUM CHLORIDE, SODIUM LACTATE, POTASSIUM CHLORIDE, AND CALCIUM CHLORIDE 100 ML/HR: 600; 310; 30; 20 INJECTION, SOLUTION INTRAVENOUS at 14:26

## 2020-05-08 RX ADMIN — MIDAZOLAM 2 MG: 1 INJECTION INTRAMUSCULAR; INTRAVENOUS at 07:48

## 2020-05-08 RX ADMIN — OXYCODONE 10 MG: 5 TABLET ORAL at 22:39

## 2020-05-08 RX ADMIN — FENTANYL CITRATE 25 MCG: 50 INJECTION INTRAMUSCULAR; INTRAVENOUS at 10:59

## 2020-05-08 RX ADMIN — ESMOLOL HYDROCHLORIDE 30 MG: 10 INJECTION, SOLUTION INTRAVENOUS at 08:37

## 2020-05-08 RX ADMIN — OXYCODONE 10 MG: 5 TABLET ORAL at 17:55

## 2020-05-08 RX ADMIN — Medication 10 ML: at 20:23

## 2020-05-08 RX ADMIN — ACETAMINOPHEN 1000 MG: 500 TABLET ORAL at 06:26

## 2020-05-08 RX ADMIN — SUGAMMADEX 700 MG: 100 INJECTION, SOLUTION INTRAVENOUS at 10:23

## 2020-05-08 RX ADMIN — FENTANYL CITRATE 25 MCG: 50 INJECTION INTRAMUSCULAR; INTRAVENOUS at 11:09

## 2020-05-08 RX ADMIN — MEPERIDINE HYDROCHLORIDE 25 MG: 50 INJECTION INTRAMUSCULAR; INTRAVENOUS; SUBCUTANEOUS at 10:30

## 2020-05-08 RX ADMIN — HYDROMORPHONE HYDROCHLORIDE 0.2 MG: 1 INJECTION, SOLUTION INTRAMUSCULAR; INTRAVENOUS; SUBCUTANEOUS at 11:20

## 2020-05-08 RX ADMIN — SUCCINYLCHOLINE CHLORIDE 300 MG: 20 INJECTION, SOLUTION INTRAMUSCULAR; INTRAVENOUS at 07:56

## 2020-05-08 RX ADMIN — FENTANYL CITRATE 25 MCG: 50 INJECTION INTRAMUSCULAR; INTRAVENOUS at 11:14

## 2020-05-08 RX ADMIN — PROPOFOL 200 MG: 10 INJECTION, EMULSION INTRAVENOUS at 07:56

## 2020-05-08 RX ADMIN — KETOROLAC TROMETHAMINE 30 MG: 30 INJECTION, SOLUTION INTRAMUSCULAR; INTRAVENOUS at 10:13

## 2020-05-08 RX ADMIN — CEFOTETAN DISODIUM 2 G: 2 INJECTION, POWDER, FOR SOLUTION INTRAMUSCULAR; INTRAVENOUS at 07:38

## 2020-05-08 RX ADMIN — HYDROMORPHONE HYDROCHLORIDE 0.2 MG: 1 INJECTION, SOLUTION INTRAMUSCULAR; INTRAVENOUS; SUBCUTANEOUS at 14:17

## 2020-05-08 RX ADMIN — HEPARIN SODIUM 5000 UNITS: 5000 INJECTION INTRAVENOUS; SUBCUTANEOUS at 14:33

## 2020-05-08 RX ADMIN — FENTANYL CITRATE 50 MCG: 50 INJECTION, SOLUTION INTRAMUSCULAR; INTRAVENOUS at 08:21

## 2020-05-08 RX ADMIN — HYDROMORPHONE HYDROCHLORIDE 0.2 MG: 1 INJECTION, SOLUTION INTRAMUSCULAR; INTRAVENOUS; SUBCUTANEOUS at 12:44

## 2020-05-08 RX ADMIN — FENTANYL CITRATE 100 MCG: 50 INJECTION, SOLUTION INTRAMUSCULAR; INTRAVENOUS at 07:56

## 2020-05-08 RX ADMIN — ACETAMINOPHEN 1000 MG: 500 TABLET, FILM COATED ORAL at 20:28

## 2020-05-08 RX ADMIN — FENTANYL CITRATE 50 MCG: 50 INJECTION, SOLUTION INTRAMUSCULAR; INTRAVENOUS at 08:29

## 2020-05-08 RX ADMIN — HYDROMORPHONE HYDROCHLORIDE 0.2 MG: 1 INJECTION, SOLUTION INTRAMUSCULAR; INTRAVENOUS; SUBCUTANEOUS at 11:35

## 2020-05-08 RX ADMIN — MIDAZOLAM 2 MG: 1 INJECTION INTRAMUSCULAR; INTRAVENOUS at 07:30

## 2020-05-08 RX ADMIN — ONDANSETRON HYDROCHLORIDE 4 MG: 2 INJECTION, SOLUTION INTRAMUSCULAR; INTRAVENOUS at 10:08

## 2020-05-08 RX ADMIN — ROCURONIUM BROMIDE 40 MG: 10 SOLUTION INTRAVENOUS at 08:04

## 2020-05-08 RX ADMIN — ROCURONIUM BROMIDE 20 MG: 10 SOLUTION INTRAVENOUS at 09:00

## 2020-05-08 RX ADMIN — ONDANSETRON HYDROCHLORIDE 4 MG: 2 INJECTION, SOLUTION INTRAMUSCULAR; INTRAVENOUS at 10:33

## 2020-05-08 RX ADMIN — ROCURONIUM BROMIDE 10 MG: 10 SOLUTION INTRAVENOUS at 07:56

## 2020-05-08 RX ADMIN — SODIUM CHLORIDE, POTASSIUM CHLORIDE, SODIUM LACTATE AND CALCIUM CHLORIDE: 600; 310; 30; 20 INJECTION, SOLUTION INTRAVENOUS at 07:30

## 2020-05-08 RX ADMIN — ROCURONIUM BROMIDE 20 MG: 10 SOLUTION INTRAVENOUS at 09:58

## 2020-05-08 RX ADMIN — HYDROMORPHONE HYDROCHLORIDE 0.2 MG: 1 INJECTION, SOLUTION INTRAMUSCULAR; INTRAVENOUS; SUBCUTANEOUS at 14:33

## 2020-05-08 RX ADMIN — LIDOCAINE HYDROCHLORIDE 100 MG: 20 INJECTION, SOLUTION EPIDURAL; INFILTRATION; INTRACAUDAL; PERINEURAL at 07:56

## 2020-05-08 NOTE — ROUTINE PROCESS
Patient: Corona Persaud MRN: [id number]  SSN: [id number] YOB: 1990  Age: 30 y.o. Sex: female Patient is status post Procedure(s): LAPAROSCOPIC CHOLECYSTECTOMY,(URGENT). Surgeon(s) and Role: 
   Jenaro Kim MD - Primary Sharon Sosa MD - Assisting Local/Dose/Irrigation:  Normal saline used for irrigation Peripheral IV 05/08/20 Left;Posterior Forearm (Active) Site Assessment Clean, dry, & intact 5/8/2020  6:20 AM  
Phlebitis Assessment 0 5/8/2020  6:20 AM  
Infiltration Assessment 0 5/8/2020  6:20 AM  
Dressing Status Clean, dry, & intact; New 5/8/2020  6:20 AM  
Dressing Type Tape;Transparent 5/8/2020  6:20 AM  
Hub Color/Line Status Blue; Infusing 5/8/2020  6:20 AM  
        
Airway - Endotracheal Tube Oral (Active) Dressing/Packing:  Wound Abdomen 6 trocar sites-Dressing Type: Sutures; Topical skin adhesive/glue(Dermabond) (05/08/20 1010) Splint/Cast:  ] Other:  SCDs

## 2020-05-08 NOTE — PERIOP NOTES
DELILAH WAS GIVEN TO THE STERILE FIELD TO BE USED BY MD DURING PROCEDURE  REF: [id number]  LOT: [id number]  EXP: 09-

## 2020-05-08 NOTE — ANESTHESIA POSTPROCEDURE EVALUATION
Post-Anesthesia Evaluation and Assessment    Patient: Anh Spangler MRN: [id number]  SSN: [id number]    YOB: 1990  Age: 30 y.o. Sex: female      I have evaluated the patient and they are stable and ready for discharge from the PACU. Cardiovascular Function/Vital Signs  Visit Vitals  /75   Pulse 94   Temp 36.8 °C (98.3 °F)   Resp 27   Ht 5' 3\" (1.6 m)   Wt (!) 180.6 kg (398 lb 2.4 oz)   SpO2 100%   BMI 70.53 kg/m²       Patient is status post General anesthesia for Procedure(s):  LAPAROSCOPIC CHOLECYSTECTOMY,(URGENT). Nausea/Vomiting: None    Postoperative hydration reviewed and adequate. Pain:  Pain Scale 1: Numeric (0 - 10) (05/08/20 1135)  Pain Intensity 1: 9 (05/08/20 1135)   Managed    Neurological Status:   Neuro (WDL): Within Defined Limits (05/08/20 1155)  Neuro  Neurologic State: Appropriate for age;Drowsy (05/08/20 1155)  Orientation Level: Oriented X4 (05/08/20 1055)  Cognition: Follows commands (05/08/20 1055)  Speech: Clear (05/08/20 1055)  LUE Motor Response: Purposeful (05/08/20 1055)  LLE Motor Response: Purposeful (05/08/20 1055)  RUE Motor Response: Purposeful (05/08/20 1055)  RLE Motor Response: Purposeful (05/08/20 1055)   At baseline    Mental Status, Level of Consciousness: Alert and  oriented to person, place, and time    Pulmonary Status:   O2 Device: Room air (05/08/20 1155)   Adequate oxygenation and airway patent    Complications related to anesthesia: None    Post-anesthesia assessment completed. No concerns    Signed By: Trisha Cox MD     May 8, 2020              Procedure(s):  LAPAROSCOPIC CHOLECYSTECTOMY,(URGENT). general    <BSHSIANPOST>    Vitals Value Taken Time   /84 5/8/2020 12:15 PM   Temp 36.8 °C (98.3 °F) 5/8/2020 10:56 AM   Pulse 94 5/8/2020 12:26 PM   Resp 26 5/8/2020 12:26 PM   SpO2 98 % 5/8/2020 12:26 PM   Vitals shown include unvalidated device data.

## 2020-05-08 NOTE — DISCHARGE INSTRUCTIONS
Patient Discharge Instructions    Kade Juarez / [id number] : 1990    Admitted 2020 Discharged: 2020       · It is important that you take the medication exactly as they are prescribed. · Keep your medication in the bottles provided by the pharmacist and keep a list of the medication names, dosages, and times to be taken in your wallet. · Do not take other medications without consulting your doctor. What to do at Home    Recommended diet: Regular. Recommended activity: No Restrictions. No Driving While Taking Oxycodone. Tylenol 1000mg every 6 hours as needed for pain. Ice pack to abdomen. Oxycodone as needed for severe pain. May Take Shower or Jasper Roxo. If you experience any of the following symptoms Fevers, Chills, Nausea, Vomitting, Redness or Drainage at Surgical Site(s) or Any Other Questions or Concerns Please Call -  [phone]. Follow-up with Dr. Dimitri Pike in 10-14 days. Information obtained by :  I understand that if any problems occur once I am at home I am to contact my physician. I understand and acknowledge receipt of the instructions indicated above. [de-identified] or R.N.'s Signature                                                                  Date/Time                                                                                                                                              Patient or Representative Signature                                                          Date/Time      Patient Education        Cholecystectomy: What to Expect at Home  Your Recovery  After your surgery, it is normal to feel weak and tired for several days after you return home. Your belly may be swollen. If you had laparoscopic surgery, you may also have pain in your shoulder for about 24 hours.   You may have gas or need to burp a lot at first, and a few people get diarrhea. The diarrhea usually goes away in 2 to 4 weeks, but it may last longer. How quickly you recover depends on whether you had a laparoscopic or open surgery. · For a laparoscopic surgery, most people can go back to work or their normal routine in 1 to 2 weeks, but it may take longer, depending on the type of work you do. · For an open surgery, it will probably take 4 to 6 weeks before you get back to your normal routine. This care sheet gives you a general idea about how long it will take for you to recover. However, each person recovers at a different pace. Follow the steps below to get better as quickly as possible. How can you care for yourself at home? Activity    · Rest when you feel tired. Getting enough sleep will help you recover.     · Try to walk each day. Start out by walking a little more than you did the day before. Gradually increase the amount you walk. Walking boosts blood flow and helps prevent pneumonia and constipation.     · For about 2 to 4 weeks, avoid lifting anything that would make you strain. This may include a child, heavy grocery bags and milk containers, a heavy briefcase or backpack, cat litter or dog food bags, or a vacuum .     · Avoid strenuous activities, such as biking, jogging, weightlifting, and aerobic exercise, until your doctor says it is okay.     · You may shower 24 to 48 hours after surgery, if your doctor okays it. Pat the cut (incision) dry. Do not take a bath for the first 2 weeks, or until your doctor tells you it is okay.     · You may drive when you are no longer taking pain medicine and can quickly move your foot from the gas pedal to the brake. You must also be able to sit comfortably for a long period of time, even if you do not plan to go far.  You might get caught in traffic.     · For a laparoscopic surgery, most people can go back to work or their normal routine in 1 to 2 weeks, but it may take longer. For an open surgery, it will probably take 4 to 6 weeks before you get back to your normal routine.     · Your doctor will tell you when you can have sex again.    Diet    · Eat smaller meals more often instead of fewer larger meals. You can eat a normal diet, but avoid eating fatty foods for about 1 month. Fatty foods include hamburger, whole milk, cheese, and many snack foods. If your stomach is upset, try bland, low-fat foods like plain rice, broiled chicken, toast, and yogurt.     · Drink plenty of fluids (unless your doctor tells you not to).   · If you have diarrhea, try avoiding spicy foods, dairy products, fatty foods, and alcohol. You can also watch to see if specific foods cause it, and stop eating them. If the diarrhea continues for more than 2 weeks, talk to your doctor.     · You may notice that your bowel movements are not regular right after your surgery. This is common. Try to avoid constipation and straining with bowel movements. You may want to take a fiber supplement every day. If you have not had a bowel movement after a couple of days, ask your doctor about taking a mild laxative. Medicines    · Your doctor will tell you if and when you can restart your medicines. He or she will also give you instructions about taking any new medicines.     · If you take aspirin or some other blood thinner, ask your doctor if and when to start taking it again. Make sure that you understand exactly what your doctor wants you to do.     · Take pain medicines exactly as directed. ? If the doctor gave you a prescription medicine for pain, take it as prescribed. ? If you are not taking a prescription pain medicine, take an over-the-counter medicine such as acetaminophen (Tylenol), ibuprofen (Advil, Motrin), or naproxen (Aleve). Read and follow all instructions on the label. ? Do not take two or more pain medicines at the same time unless the doctor told you to.  Many pain medicines contain acetaminophen, which is Tylenol. Too much Tylenol can be harmful.     · If you think your pain medicine is making you sick to your stomach:  ? Take your medicine after meals (unless your doctor tells you not to). ? Ask your doctor for a different pain medicine.     · If your doctor prescribed antibiotics, take them as directed. Do not stop taking them just because you feel better. You need to take the full course of antibiotics. Incision care    · If you have strips of tape on the incision, or cut, leave the tape on for a week or until it falls off.     · After 24 to 48 hours, wash the area daily with warm, soapy water, and pat it dry.     · You may have staples to hold the cut together. Keep them dry until your doctor takes them out. This is usually in 7 to 10 days.     · Keep the area clean and dry. You may cover it with a gauze bandage if it weeps or rubs against clothing. Change the bandage every day. Ice    · To reduce swelling and pain, put ice or a cold pack on your belly for 10 to 20 minutes at a time. Do this every 1 to 2 hours. Put a thin cloth between the ice and your skin. Follow-up care is a key part of your treatment and safety. Be sure to make and go to all appointments, and call your doctor if you are having problems. It's also a good idea to know your test results and keep a list of the medicines you take. When should you call for help? Call 911 anytime you think you may need emergency care. For example, call if:    · You passed out (lost consciousness).     · You are short of breath. Kaveh Pop your doctor now or seek immediate medical care if:    · You are sick to your stomach and cannot drink fluids.     · You have pain that does not get better when you take your pain medicine.     · You cannot pass stools or gas.     · You have signs of infection, such as:  ? Increased pain, swelling, warmth, or redness. ? Red streaks leading from the incision. ? Pus draining from the incision. ?  A fever.     · Bright red blood has soaked through the bandage over your incision.     · You have loose stitches, or your incision comes open.     · You have signs of a blood clot in your leg (called a deep vein thrombosis), such as:  ? Pain in your calf, back of knee, thigh, or groin. ? Redness and swelling in your leg or groin.    Watch closely for any changes in your health, and be sure to contact your doctor if you have any problems. Where can you learn more? Go to http://leodan-graciela.info/  Enter F357 in the search box to learn more about \"Cholecystectomy: What to Expect at Home. \"  Current as of: August 11, 2019Content Version: 12.4  © 1318-0213 [employer]. Care instructions adapted under license by [employer] (which disclaims liability or warranty for this information). If you have questions about a medical condition or this instruction, always ask your healthcare professional. [address] any warranty or liability for your use of this information. ______________________________________________________________________    Anesthesia Discharge Instructions    After general anesthesia or intervenous sedation, for 24 hours or while taking prescription Narcotics:  · Limit your activities  · Do not drive or operate hazardous machinery  · If you have not urinated within 8 hours after discharge, please contact your surgeon on call. · Do not make important personal or business decisions  · Do not drink alcoholic beverages    Report the following to your surgeon:  · Excessive pain, swelling, redness or odor of or around the surgical area  · Temperature over 100.5 degrees  · Nausea and vomiting lasting longer than 4 hours or if unable to take medication  · Any signs of decreased circulation or nerve impairment to extremity:  Change in color, persistent numbness, tingling, coldness or increased pain.   · Any questions  TYLENOL GIVEN AT 6:25  TORADOL WAS GIVEN AT 10:13  SUGAMMDEX WAS IN OR. THIS CAN EFFECT THE PERFORMANCE OF BIRTH CONTROL MEDICATIONS. THEREFOR ANOTHER TYPE OF BIRTH CONTROL MUST BE USED UNTIL YOUR NEXT MENSTRAL CYCLE.

## 2020-05-08 NOTE — BRIEF OP NOTE
Brief Postoperative Note    Patient: Dana Fuller  YOB: 1990  MRN: [id number]    Date of Procedure: 5/8/2020     Pre-Op Diagnosis:  Symptomatic Cholelithiasis. Post-Op Diagnosis:  Same. Procedure(s):   Laparoscopic Cholecystectomy. Surgeon(s):  MD Hamlet Moraes MD    Surgical Assistant: None    Anesthesia: General     Estimated Blood Loss (mL): Approx. 60ml. Complications: None    Specimens:   ID Type Source Tests Collected by Time Destination   1 : Gallbladder and lymph node Fresh Gallbladder  Dahlia Nielsen MD 5/8/2020 1006 Pathology        Implants: * No implants in log *    Drains: * No LDAs found *    Findings: Chronic cholecystitis. Critical view obtained.     Electronically Signed by Rachel Mendoza MD on 5/8/2020 at 10:31 AM

## 2020-05-08 NOTE — PERIOP NOTES
PATIENT SAYS SHE DOES NOT FEEL SHE CAN GET UP AND GET DRESSED OR MOVE FROM CAR TO WHEELCHAIR. SHE FEELS SHE NEEDS TO STAY. I HAD DISCUSSED THIS WITH DR. Mara Naidu EARLIER. I NOTIFIED HER  AND TOLD BOTH OF THEM SHE CAN LEAVE LATER TODAY IF SHE FEELS ABLE.

## 2020-05-08 NOTE — ANESTHESIA PREPROCEDURE EVALUATION
Relevant Problems   No relevant active problems       Anesthetic History   No history of anesthetic complications            Review of Systems / Medical History  Patient summary reviewed, nursing notes reviewed and pertinent labs reviewed    Pulmonary  Within defined limits                 Neuro/Psych   Within defined limits           Cardiovascular  Within defined limits                     GI/Hepatic/Renal  Within defined limits              Endo/Other        Morbid obesity     Other Findings              Physical Exam    Airway  Mallampati: II  TM Distance: > 6 cm  Neck ROM: normal range of motion   Mouth opening: Normal     Cardiovascular  Regular rate and rhythm,  S1 and S2 normal,  no murmur, click, rub, or gallop             Dental  No notable dental hx       Pulmonary  Breath sounds clear to auscultation               Abdominal  GI exam deferred       Other Findings            Anesthetic Plan    ASA: 3  Anesthesia type: general          Induction: Intravenous  Anesthetic plan and risks discussed with: Patient

## 2020-05-08 NOTE — PERIOP NOTES
TRANSFER - OUT REPORT:    Verbal report given to FLACO PICKETT(name) on Kimberly Morillo  being transferred to 3 N(unit) for routine post - op       Report consisted of patients Situation, Background, Assessment and   Recommendations(SBAR). Time Pre op antibiotic given:Y  Anesthesia Stop time: N  Hewitt Present on Transfer to floor:N  Order for Hewitt on Chart:N  Discharge Prescriptions with Chart:Y    Information from the following report(s) SBAR was reviewed with the receiving nurse. Opportunity for questions and clarification was provided. Is the patient on 02? NO       L/Min        Other     Is the patient on a monitor? NO    Is the nurse transporting with the patient? YES    Surgical Waiting Area notified of patient's transfer from PACU? NO      The following personal items collected during your admission accompanied patient upon transfer:   Dental Appliance:    Vision:    Hearing Aid:    Jewelry: Jewelry: None  Clothing: Clothing: (clothing to PACU)  Other Valuables:  Other Valuables: Cell Phone, Other (comment)(ID and Insurance Cards)  Valuables sent to safe: Personal Items Sent to Safe: #[id number]

## 2020-05-08 NOTE — PERIOP NOTES
Patient was unable to void on bedpan, refusing to go to bathroom, bedside commode will not take her weighjt. Bladder scanned for 520 cc.  was called for straight cath order. The bladder was emptied for 600 cc. . there was a white discharge in the siomara area.

## 2020-05-08 NOTE — PERIOP NOTES
PATIENT AWAKE NOW AND C/O FEELING LIKE SHE IS NOT GETTING ANY AIR ON HE RIGHT SIDE. BREATH SOUNDS ARE CLEAR THROUGHOUT. HER RESPIRATORY RATE IS STILL HIGH. DR. TARANGO WAS CALLED AND CXR ORDERED.

## 2020-05-08 NOTE — PROGRESS NOTES
TRANSFER - IN REPORT:    Verbal report received from [id number] Alicia N RN(name) on Hamlet Scales  being received from PACU(unit) for routine post - op      Report consisted of patients Situation, Background, Assessment and   Recommendations(SBAR). Information from the following report(s) SBAR, Kardex, OR Summary, Procedure Summary, Intake/Output, MAR, Recent Results and Med Rec Status was reviewed with the receiving nurse. Opportunity for questions and clarification was provided. Assessment completed upon patients arrival to unit and care assumed.

## 2020-05-09 VITALS
DIASTOLIC BLOOD PRESSURE: 81 MMHG | TEMPERATURE: 98.6 F | HEART RATE: 88 BPM | RESPIRATION RATE: 18 BRPM | OXYGEN SATURATION: 99 % | BODY MASS INDEX: 51.91 KG/M2 | HEIGHT: 63 IN | SYSTOLIC BLOOD PRESSURE: 129 MMHG | WEIGHT: 293 LBS

## 2020-05-09 PROCEDURE — 99218 HC RM OBSERVATION: CPT

## 2020-05-09 PROCEDURE — 74011250637 HC RX REV CODE- 250/637: Performed by: SURGERY

## 2020-05-09 PROCEDURE — 74011250636 HC RX REV CODE- 250/636: Performed by: SURGERY

## 2020-05-09 RX ADMIN — OXYCODONE 10 MG: 5 TABLET ORAL at 03:51

## 2020-05-09 RX ADMIN — ACETAMINOPHEN 1000 MG: 500 TABLET, FILM COATED ORAL at 06:46

## 2020-05-09 RX ADMIN — OXYCODONE 10 MG: 5 TABLET ORAL at 10:31

## 2020-05-09 RX ADMIN — HEPARIN SODIUM 5000 UNITS: 5000 INJECTION INTRAVENOUS; SUBCUTANEOUS at 06:46

## 2020-05-09 RX ADMIN — SODIUM CHLORIDE, SODIUM LACTATE, POTASSIUM CHLORIDE, AND CALCIUM CHLORIDE 100 ML/HR: 600; 310; 30; 20 INJECTION, SOLUTION INTRAVENOUS at 03:52

## 2020-05-09 NOTE — PROGRESS NOTES
Progress Note    Patient: Ozzie Whittington MRN: [id number]  SSN: [id number]    YOB: 1990  Age: 30 y.o. Sex: female      Admit Date: 2020    1 Day Post-Op    Procedure:  Procedure(s):  LAPAROSCOPIC CHOLECYSTECTOMY,(URGENT)    Subjective:     Patient complains of some bloating. Mild pain. No nausea or vomiting. Objective:     Visit Vitals  /81 (BP Patient Position: At rest)   Pulse 88   Temp 98.6 °F (37 °C)   Resp 18   Ht 5' 3\" (1.6 m)   Wt (!) 398 lb 2.4 oz (180.6 kg)   SpO2 99%   BMI 70.53 kg/m²       Temp (24hrs), Av.5 °F (36.9 °C), Min:98.2 °F (36.8 °C), Max:99.1 °F (37.3 °C)      Physical Exam:    Gen- alert in NAd  Lungs- CTA  H-RRR  Abd-s/appropriately tender. Incisions intact    Data Review: images and reports reviewed    Lab Review: All lab results for the last 24 hours reviewed. No results found for this or any previous visit (from the past 24 hour(s)). Assessment:     Hospital Problems  Date Reviewed: 2020          Codes Class Noted POA    Cholelithiasis ICD-10-CM: K80.20  ICD-9-CM: 574.20  2020 Unknown              Plan/Recommendations/Medical Decision Making:   S/p Lap octavio. Doing well  OOB and ambulate  Discharge home.

## 2020-05-09 NOTE — PROGRESS NOTES
Bedside and Verbal shift change report given to Angeles Victoria RN (oncoming nurse) by Raymundo Berrios RN (offgoing nurse). Report included SBAR, Kardex, Intake/Output, MAR, Recent Results and Progress of Care. I accept this patient to my care at this time. Any subsequent documentation in this Progress Note is intended to be information adjunct to other components of the patient's electronic medical record. Refer to accompanying timestamp(s) for chronology.

## 2020-05-13 ENCOUNTER — TELEPHONE (OUTPATIENT)
Dept: SURGERY | Age: 30
End: 2020-05-13

## 2020-05-13 NOTE — LETTER
NOTIFICATION OF RETURN TO WORK / SCHOOL 
 
5/13/2020 4:26 PM 
 
Ms. Ozzie Whittington [address] [facility] [address] [place] To Whom It May Concern: 
 
Re:  Osman Fitch Ozzie Whittington was under the care of [facility] from 5/4/2020 to present. She was admitted for surgery 5/8/2020 to 5/9/2020. If there are questions or concerns please have the patient contact our office. Sincerely, Arcelia Quezada MD

## 2020-05-13 NOTE — TELEPHONE ENCOUNTER
Patient called and would like a call back from the nurse. She has some questions regarding her surgery on 5/8/20.

## 2020-05-13 NOTE — DISCHARGE SUMMARY
Physician Discharge Summary     Patient ID:  Anthony Sims  [id number]  30 y.o.  1990    Allergies: Patient has no known allergies. Admit Date: 5/8/2020    Discharge Date: 5/9/2020    * Admission Diagnoses: Cholelithiasis [K80.20]    * Discharge Diagnoses:    Hospital Problems as of 5/9/2020 Date Reviewed: 5/5/2020          Codes Class Noted - Resolved POA    Cholelithiasis ICD-10-CM: K80.20  ICD-9-CM: 574.20  5/8/2020 - Present Unknown               Admission Condition: Good    * Discharge Condition: Good    * Procedures:  Laparoscopic Cholecystectomy - 5/8/2020    * Hospital Course:   Normal hospital course for this procedure. Consults: None    Significant Diagnostic Studies: None    * Disposition: Home    Discharge Medications:   Discharge Medication List as of 5/9/2020  9:20 AM      START taking these medications    Details   oxyCODONE IR (ROXICODONE) 5 mg immediate release tablet Take 1 Tab by mouth every four (4) hours as needed for Pain for up to 3 days. Max Daily Amount: 30 mg., Print, Disp-12 Tab, R-0         CONTINUE these medications which have NOT CHANGED    Details   ondansetron (Zofran ODT) 4 mg disintegrating tablet Take 1 Tab by mouth every eight (8) hours as needed for Nausea or Vomiting., Print, Disp-20 Tab, R-0             * Follow-up Care/Patient Instructions: Activity: Activity as tolerated  Diet: Regular Diet  Wound Care: None needed    Follow-up Information     Follow up With Specialties Details Why Contact Info    None    None ([phone]) Patient stated that they have no PCP      Sahil Hernandez MD General Surgery, Breast Surgery, Oncology Schedule an appointment as soon as possible for a visit in 2 weeks For wound re-check [address] [address]  [phone]          Follow-up tests/labs None.     Signed:  Makeda Sosa MD  5/13/2020  12:13 PM

## 2020-05-14 NOTE — TELEPHONE ENCOUNTER
Patient identified with two patient identifiers. Patient 5 days post lap octavio with complaint that she didn't receive follow up call after discharge from hospital.  Patient also stating her discharge instructions states she was admitted and discharged 5/8/20 and she was discharged 5/9/20. Patient states her  was going to use discharge instructions to show his employer that she was in the hospital for 2 days. Patient questioning why she has 6 lap sites instead of 3-4 and she thought she saw something on Mychart about a hernia but can not re call where. Patient has C/O abd and back pain only with position change, no C/O nausea and vomiting or fever. States incision under breast the glue has come off no C/O redness, swelling, pain , or purulent drainage. She is keep area clean dry and covered. Confirmed with patient she was admitted 5/8 and discharged 5/9 as documented in chart, she can get letter for 's job to update them regarding time off, letter will be emailed to patient per request.  Patient informed op not does not mention repair a hernia and do not see this diagnosis in her problem list so I'm not sure where she saw this. Patient will review mychart. Patient informed she had more lap sites due to current weight they may have needed additional probes/surgical tools to adequately remove her gallbladder. Patient instructed to continue to get up and walk move around, she can use over the ibuprofen and tylenol as needed with food if out of prescription pain medication. She can apply heating pad or warm compress and needed for discomfort when changing positions. Patient states she is concerned about apply heat it may melt glue into her skin. Informed patient she will be fine she should wrap heating pad or compress in towel and never apply directly to skin. Patient will continue to monitor for any signs or symptoms of infection and return all if any other questions or concerns.   Discussed how she can send photos via [employer] if she has any concerns regarding her incisions. Patient in agreement.

## 2020-05-18 ENCOUNTER — TELEPHONE (OUTPATIENT)
Dept: SURGERY | Age: 30
End: 2020-05-18

## 2020-05-18 NOTE — TELEPHONE ENCOUNTER
Patient called stating her one area of her wound is opened still and bleeding. I offered patient virtual for her post op appointment on Thursday and patient stated she thinks she needs to come in instead of doing virtual.  Please advise with scheduling. Please route to the front office pool.

## 2020-05-19 ENCOUNTER — PATIENT OUTREACH (OUTPATIENT)
Dept: CASE MANAGEMENT | Age: 30
End: 2020-05-19

## 2020-05-19 NOTE — PROGRESS NOTES
Patient resolved from Transition of Care episode on 5/19/20  Patient/family has been provided the following resources and education related to COVID-19:                         Signs, symptoms and red flags related to COVID-19            CDC exposure and quarantine guidelines            [facility] exposure contact - [phone]            Contact for their local Department of Health                 Patient currently reports that the following symptoms have improved:  no worsening pain. .    No further outreach scheduled with this CTN/ACM. Episode of Care resolved. Patient has this CTN/ACM contact information if future needs arise. Patient reports post cholecystectomy she is still having \"bad pain in my back\". She also notes that one lap site opened up and she was in contact with the surgeons office and was told to cover it with a bandaid which she has been doing. Notes that drainage is orangish yellow in color and clear it does not look purulent and is not red or swollen. She notes she was concerned yesterday and poured peroxide on it. Recommended she not put any more peroxide on it. Patient reports when eating she feels full faster and is only having 1 bm a day where normally she would have 2. Suggested she discuss her concerns with the NP at her surgical follow up on Thursday. Patient notes understanding no other concerns at this time.

## 2020-05-21 ENCOUNTER — OFFICE VISIT (OUTPATIENT)
Dept: SURGERY | Age: 30
End: 2020-05-21

## 2020-05-21 VITALS
SYSTOLIC BLOOD PRESSURE: 141 MMHG | DIASTOLIC BLOOD PRESSURE: 84 MMHG | WEIGHT: 293 LBS | HEART RATE: 92 BPM | OXYGEN SATURATION: 98 % | TEMPERATURE: 98.4 F | HEIGHT: 63 IN | BODY MASS INDEX: 51.91 KG/M2 | RESPIRATION RATE: 18 BRPM

## 2020-05-21 DIAGNOSIS — Z09 POSTOPERATIVE EXAMINATION: Primary | ICD-10-CM

## 2020-05-21 NOTE — PROGRESS NOTES
Subjective:      Aba Saravia is a 30 y.o. female presents for postop care Laparoscopic Cholecystectomy days following  On 5/8/2020. Appetite is fair. Eating a regular diet without difficulty. Bowel movements are regular. The patient is voiding without difficulty. She complains of some pain. She has been taking Tylenol without much relief. She complains of some soreness at the incision site. Her right sided incision drained a little and she put peroxide on it. She has been keeping a band-aid on the incision. .    Patient has an advanced directive: not on file. Ms. Luis Black has a reminder for a \"due or due soon\" health maintenance. I have asked that she contact her primary care provider for follow-up on this health maintenance. Objective:     Visit Vitals  /84 (BP 1 Location: Left arm, BP Patient Position: Sitting)   Pulse 92   Temp 98.4 °F (36.9 °C) (Oral)   Resp 18   Ht 5' 3\" (1.6 m)   Wt (!) 394 lb (178.7 kg)   SpO2 98%   BMI 69.79 kg/m²       General:  alert, cooperative   Abdomen: soft, bowel sounds active, non-tender   Incision:   healing well, no erythema. Right sided incision with scant amount of serous drainage. Assessment:     Doing well postoperatively. Plan:     Follow up as needed  May take ibuprofen 600 mg every 8 hours as needed for pain. Path reviewed with patient. May increase activity as tolerated. Pt verbalized understanding and questions were answered to the best of my knowledge and ability.

## 2020-05-21 NOTE — PATIENT INSTRUCTIONS
Cholecystectomy: What to Expect at [facility] Your Recovery After your surgery, it is normal to feel weak and tired for several days after you return home. Your belly may be swollen. If you had laparoscopic surgery, you may also have pain in your shoulder for about 24 hours. You may have gas or need to burp a lot at first, and a few people get diarrhea. The diarrhea usually goes away in 2 to 4 weeks, but it may last longer. How quickly you recover depends on whether you had a laparoscopic or open surgery. · For a laparoscopic surgery, most people can go back to work or their normal routine in 1 to 2 weeks, but it may take longer, depending on the type of work you do. · For an open surgery, it will probably take 4 to 6 weeks before you get back to your normal routine. This care sheet gives you a general idea about how long it will take for you to recover. However, each person recovers at a different pace. Follow the steps below to get better as quickly as possible. How can you care for yourself at home? Activity 
  · Rest when you feel tired. Getting enough sleep will help you recover.  
  · Try to walk each day. Start out by walking a little more than you did the day before. Gradually increase the amount you walk. Walking boosts blood flow and helps prevent pneumonia and constipation.  
  · For about 2 to 4 weeks, avoid lifting anything that would make you strain. This may include a child, heavy grocery bags and milk containers, a heavy briefcase or backpack, cat litter or dog food bags, or a vacuum .  
  · Avoid strenuous activities, such as biking, jogging, weightlifting, and aerobic exercise, until your doctor says it is okay.  
  · You may shower 24 to 48 hours after surgery, if your doctor okays it. Pat the cut (incision) dry.  Do not take a bath for the first 2 weeks, or until your doctor tells you it is okay.  
  · You may drive when you are no longer taking pain medicine and can quickly move your foot from the gas pedal to the brake. You must also be able to sit comfortably for a long period of time, even if you do not plan to go far. You might get caught in traffic.  
  · For a laparoscopic surgery, most people can go back to work or their normal routine in 1 to 2 weeks, but it may take longer. For an open surgery, it will probably take 4 to 6 weeks before you get back to your normal routine.  
  · Your doctor will tell you when you can have sex again.  
 Diet 
  · Eat smaller meals more often instead of fewer larger meals. You can eat a normal diet, but avoid eating fatty foods for about 1 month. Fatty foods include hamburger, whole milk, cheese, and many snack foods. If your stomach is upset, try bland, low-fat foods like plain rice, broiled chicken, toast, and yogurt.  
  · Drink plenty of fluids (unless your doctor tells you not to).   · If you have diarrhea, try avoiding spicy foods, dairy products, fatty foods, and alcohol. You can also watch to see if specific foods cause it, and stop eating them. If the diarrhea continues for more than 2 weeks, talk to your doctor.  
  · You may notice that your bowel movements are not regular right after your surgery. This is common. Try to avoid constipation and straining with bowel movements. You may want to take a fiber supplement every day. If you have not had a bowel movement after a couple of days, ask your doctor about taking a mild laxative. Medicines 
  · Your doctor will tell you if and when you can restart your medicines. He or she will also give you instructions about taking any new medicines.  
  · If you take aspirin or some other blood thinner, ask your doctor if and when to start taking it again. Make sure that you understand exactly what your doctor wants you to do.  
  · Take pain medicines exactly as directed. ? If the doctor gave you a prescription medicine for pain, take it as prescribed. ? If you are not taking a prescription pain medicine, take an over-the-counter medicine such as acetaminophen (Tylenol), ibuprofen (Advil, Motrin), or naproxen (Aleve). Read and follow all instructions on the label. ? Do not take two or more pain medicines at the same time unless the doctor told you to. Many pain medicines contain acetaminophen, which is Tylenol. Too much Tylenol can be harmful.  
  · If you think your pain medicine is making you sick to your stomach: 
? Take your medicine after meals (unless your doctor tells you not to). ? Ask your doctor for a different pain medicine.  
  · If your doctor prescribed antibiotics, take them as directed. Do not stop taking them just because you feel better. You need to take the full course of antibiotics. Incision care 
  · If you have strips of tape on the incision, or cut, leave the tape on for a week or until it falls off.  
  · After 24 to 48 hours, wash the area daily with warm, soapy water, and pat it dry.  
  · You may have staples to hold the cut together. Keep them dry until your doctor takes them out. This is usually in 7 to 10 days.  
  · Keep the area clean and dry. You may cover it with a gauze bandage if it weeps or rubs against clothing. Change the bandage every day. Ice 
  · To reduce swelling and pain, put ice or a cold pack on your belly for 10 to 20 minutes at a time. Do this every 1 to 2 hours. Put a thin cloth between the ice and your skin. Follow-up care is a key part of your treatment and safety. Be sure to make and go to all appointments, and call your doctor if you are having problems. It's also a good idea to know your test results and keep a list of the medicines you take. When should you call for help? Call 911 anytime you think you may need emergency care. For example, call if: 
  · You passed out (lost consciousness).  
  · You are short of breath. Cait Iqbaliper your doctor now or seek immediate medical care if:   · You are sick to your stomach and cannot drink fluids.  
  · You have pain that does not get better when you take your pain medicine.  
  · You cannot pass stools or gas.  
  · You have signs of infection, such as: 
? Increased pain, swelling, warmth, or redness. ? Red streaks leading from the incision. ? Pus draining from the incision. ? A fever.  
  · Bright red blood has soaked through the bandage over your incision.  
  · You have loose stitches, or your incision comes open.  
  · You have signs of a blood clot in your leg (called a deep vein thrombosis), such as: 
? Pain in your calf, back of knee, thigh, or groin. ? Redness and swelling in your leg or groin.  
 Watch closely for any changes in your health, and be sure to contact your doctor if you have any problems. Where can you learn more? Go to http://leodan-graciela.info/ Enter 985 56 054 in the search box to learn more about \"Cholecystectomy: What to Expect at Home. \" Current as of: August 11, 2019Content Version: 12.4 © 6945-2176 [employer]. Care instructions adapted under license by [employer] (which disclaims liability or warranty for this information). If you have questions about a medical condition or this instruction, always ask your healthcare professional. [place] [address] any warranty or liability for your use of this information.

## 2020-05-21 NOTE — PROGRESS NOTES
1. Have you been to the ER, urgent care clinic since your last visit? Hospitalized since your last visit? No    2. Have you seen or consulted any other health care providers outside of the [address] [place] [address] since your last visit? Include any pap smears or colon screening.  No

## 2021-03-15 ENCOUNTER — APPOINTMENT (OUTPATIENT)
Dept: GENERAL RADIOLOGY | Age: 31
End: 2021-03-15
Attending: EMERGENCY MEDICINE
Payer: COMMERCIAL

## 2021-03-15 ENCOUNTER — HOSPITAL ENCOUNTER (EMERGENCY)
Age: 31
Discharge: HOME | End: 2021-03-15
Attending: EMERGENCY MEDICINE | Admitting: EMERGENCY MEDICINE
Payer: COMMERCIAL

## 2021-03-15 ENCOUNTER — APPOINTMENT (OUTPATIENT)
Dept: VASCULAR SURGERY | Age: 31
End: 2021-03-15
Attending: EMERGENCY MEDICINE
Payer: COMMERCIAL

## 2021-03-15 VITALS
BODY MASS INDEX: 47.09 KG/M2 | WEIGHT: 293 LBS | OXYGEN SATURATION: 97 % | SYSTOLIC BLOOD PRESSURE: 133 MMHG | RESPIRATION RATE: 16 BRPM | DIASTOLIC BLOOD PRESSURE: 81 MMHG | HEIGHT: 66 IN | TEMPERATURE: 98 F | HEART RATE: 84 BPM

## 2021-03-15 DIAGNOSIS — M25.562 ARTHRALGIA OF LEFT LOWER LEG: Primary | ICD-10-CM

## 2021-03-15 LAB — HCG UR QL: NEGATIVE

## 2021-03-15 PROCEDURE — 93970 EXTREMITY STUDY: CPT

## 2021-03-15 PROCEDURE — 81025 URINE PREGNANCY TEST: CPT

## 2021-03-15 PROCEDURE — 99284 EMERGENCY DEPT VISIT MOD MDM: CPT

## 2021-03-15 PROCEDURE — 73590 X-RAY EXAM OF LOWER LEG: CPT

## 2021-03-15 NOTE — DISCHARGE INSTRUCTIONS
We hope that we have addressed all of your medical concerns. The examination and treatment you received in the Emergency Department were for an emergent problem and were not intended as complete care. It is important that you follow up with your healthcare provider(s) for ongoing care. If your symptoms worsen or do not improve as expected, and you are unable to reach your usual health care provider(s), you should return to the Emergency Department. Today's healthcare is undergoing tremendous change, and patient satisfaction surveys are one of the many tools to assess the quality of medical care. You may receive a survey from the [facility] regarding your experience in the Emergency Department. I hope that your experience has been completely positive, particularly the medical care that I provided. As such, please participate in the survey; anything less than excellent does not meet my expectations or intentions. [facility] and [address] [place] [address] participate in nationally recognized quality of care measures. If your blood pressure is greater than 120/80, as reported below, we urge that you seek medical care to address the potential of high blood pressure, commonly known as hypertension. Hypertension can be hereditary or can be caused by certain medical conditions, pain, stress, or \"white coat syndrome. \"       Please make an appointment with your health care provider(s) for follow up of your Emergency Department visit. VITALS:   Patient Vitals for the past 8 hrs:   Temp Pulse Resp BP SpO2   03/15/21 0234 98.9 °F (37.2 °C) 90 16 138/85 97 %          Thank you for allowing us to provide you with medical care today. We realize that you have many choices for your emergency care needs. Please choose us in the future for any continued health care needs. Ronak Ask  Claudetta Polite, [address] [place] [address] [address]   Office: [phone]            Recent Results (from the past 24 hour(s))   HCG URINE, QL. - POC    Collection Time: 03/15/21  2:52 AM   Result Value Ref Range    Pregnancy test,urine (POC) Negative NEG         Xr Tib/fib Lt    Result Date: 3/15/2021  EXAM: XR TIB/FIB LT INDICATION: swelling, injury. COMPARISON: None. FINDINGS: AP and lateral  views of the left tibia and fibula demonstrate no fracture or other acute osseous, articular or soft tissue abnormality. No acute abnormality.

## 2021-03-15 NOTE — Clinical Note
Tarik Barry 53 
[address] [facility] [address] [id number] [phone] Work/School Note Date: 3/15/2021 To Whom It May concern: 
 
 
Carlos Finch was seen and treated today in the emergency room by the following provider(s): 
Attending Provider: Angie Moran MD. Carlos Finch is excused from work/school on 03/15/21. She is clear to return to work/school on 03/16/21.    
 
 
Sincerely, 
 
 
 
 
Shahnaz Aleman MD

## 2021-03-15 NOTE — ED TRIAGE NOTES
Patient arrives to the ED with c /o pain o her left LE, after falling off of a stool in October/November , + bruising, painful to touch.

## 2021-03-15 NOTE — ED PROVIDER NOTES
HPI   25 yo F presents with left leg pain. Pt state she fell off a stool in October, had a bruise to left lower leg for several months, also with bruise right lower leg but right leg without pain. Pain to right calf, 8/10 at times, 7/10 at this time. Denies fever, chills, swelling, cp, sob, weakness, numbness. Is able to bear weight.   LMP-Feb. 12  Past Medical History:   Diagnosis Date    Calculus of gallbladder without cholecystitis without obstruction 2020    Morbid obesity ([address]     Nausea & vomiting        Past Surgical History:   Procedure Laterality Date    HX  SECTION  2012    HX  SECTION  2016    HX LAP CHOLECYSTECTOMY  2020         Family History:   Problem Relation Age of Onset    Other Mother         SMOKER    Bipolar Disorder Father     Hypertension Father     No Known Problems Sister     No Known Problems Brother     Hypertension Maternal Grandmother     Cataract Maternal Grandmother     No Known Problems Brother     No Known Problems Brother     Anesth Problems Neg Hx        Social History     Socioeconomic History    Marital status: SINGLE     Spouse name: Not on file    Number of children: Not on file    Years of education: Not on file    Highest education level: Not on file   Occupational History    Not on file   Social Needs    Financial resource strain: Not on file    Food insecurity     Worry: Not on file     Inability: Not on file   [employer] needs     Medical: Not on file     Non-medical: Not on file   Tobacco Use    Smoking status: Never Smoker    Smokeless tobacco: Never Used   Substance and Sexual Activity    Alcohol use: Not on file     Comment: RARELY-DRINKS WINE    Drug use: Not Currently     Types: Marijuana     Comment: TEN YEARS AGO    Sexual activity: Not on file   Lifestyle    Physical activity     Days per week: Not on file     Minutes per session: Not on file    Stress: Not on file   Relationships    Social connections     Talks on phone: Not on file     Gets together: Not on file     Attends Holiness service: Not on file     Active member of club or organization: Not on file     Attends meetings of clubs or organizations: Not on file     Relationship status: Not on file    Intimate partner violence     Fear of current or ex partner: Not on file     Emotionally abused: Not on file     Physically abused: Not on file     Forced sexual activity: Not on file   Other Topics Concern    Not on file   Social History Narrative    Not on file         ALLERGIES: Patient has no known allergies. Review of Systems   Constitutional: Negative for chills and fever. Respiratory: Negative for cough and shortness of breath. Cardiovascular: Positive for leg swelling. Negative for chest pain. Gastrointestinal: Negative for nausea and vomiting. Musculoskeletal: Positive for myalgias. Skin: Positive for rash. Neurological: Negative for weakness and numbness. All other systems reviewed and are negative. There were no vitals filed for this visit.          Physical Exam   Physical Examination: General appearance - alert, well appearing, and in no distress, oriented to person, place, and time and normal appearing weight  Eyes - pupils equal and reactive, extraocular eye movements intact  Neck - supple, no significant adenopathy  Chest - clear to auscultation, no wheezes, rales or rhonchi, symmetric air entry  Heart - normal rate, regular rhythm, normal S1, S2, no murmurs, rubs, clicks or gallops  Abdomen - soft, nontender, nondistended, no masses or organomegaly  Back exam - full range of motion, no tenderness, palpable spasm or pain on motion  Neurological - alert, oriented, normal speech, no focal findings or movement disorder noted  Musculoskeletal - no joint tenderness, deformity or swelling  Extremities - peripheral pulses normal, 5 cm linear raised area of bruising to left medial lower leg, NVI with strong pedal pulses, no bony tenderness, is able to ambulate and bear weight  Skin - normal coloration and turgor, no rashes, no suspicious skin lesions noted  MDM  Number of Diagnoses or Management Options     Amount and/or Complexity of Data Reviewed  Tests in the radiology section of CPT®: ordered and reviewed  Independent visualization of images, tracings, or specimens: yes    Patient Progress  Patient progress: stable         Procedures  Imaging unremarkable. No DVT on ultrasound. VSS. F/u with ortho or pcp or return to ED for worsening symptoms.

## 2021-12-17 ENCOUNTER — HOSPITAL ENCOUNTER (EMERGENCY)
Age: 31
Discharge: HOME | End: 2021-12-17
Attending: EMERGENCY MEDICINE
Payer: COMMERCIAL

## 2021-12-17 VITALS
RESPIRATION RATE: 18 BRPM | HEIGHT: 65 IN | HEART RATE: 90 BPM | DIASTOLIC BLOOD PRESSURE: 91 MMHG | OXYGEN SATURATION: 98 % | BODY MASS INDEX: 48.82 KG/M2 | WEIGHT: 293 LBS | TEMPERATURE: 97.7 F | SYSTOLIC BLOOD PRESSURE: 156 MMHG

## 2021-12-17 DIAGNOSIS — R05.9 COUGH: Primary | ICD-10-CM

## 2021-12-17 DIAGNOSIS — Z34.90 EARLY STAGE OF PREGNANCY: ICD-10-CM

## 2021-12-17 DIAGNOSIS — J02.9 SORE THROAT: ICD-10-CM

## 2021-12-17 PROCEDURE — 99282 EMERGENCY DEPT VISIT SF MDM: CPT

## 2021-12-17 RX ORDER — PREDNISONE 20 MG/1
40 TABLET ORAL DAILY
Qty: 10 TABLET | Refills: 0 | Status: SHIPPED | OUTPATIENT
Start: 2021-12-17 | End: 2021-12-22

## 2021-12-17 RX ORDER — ALBUTEROL SULFATE 90 UG/1
2 AEROSOL, METERED RESPIRATORY (INHALATION)
Qty: 1 EACH | Refills: 0 | Status: SHIPPED | OUTPATIENT
Start: 2021-12-17 | End: 2021-12-22

## 2021-12-17 RX ORDER — GUAIFENESIN 600 MG/1
600 TABLET, EXTENDED RELEASE ORAL 2 TIMES DAILY
Qty: 10 TABLET | Refills: 0 | Status: SHIPPED | OUTPATIENT
Start: 2021-12-17 | End: 2021-12-22

## 2021-12-17 NOTE — ED PROVIDER NOTES
22-year-old female presents from home with complaint of cough and sore throat. Patient's been feeling sick for the past few days. Denies any fever at home. She has had some morning sickness because she is 11 weeks pregnant along with some posttussive emesis. She is also had a couple days of diarrhea. Denies any chest pain, abdominal pain. She has been using some over-the-counter cough medications with some relief. No known sick contacts. No other complaints at this time.            Past Medical History:   Diagnosis Date    Calculus of gallbladder without cholecystitis without obstruction 2020    Morbid obesity ([address]     Nausea & vomiting        Past Surgical History:   Procedure Laterality Date    HX  SECTION  2012    HX  SECTION  2016    HX LAP CHOLECYSTECTOMY  2020         Family History:   Problem Relation Age of Onset    Other Mother         SMOKER    Bipolar Disorder Father     Hypertension Father     No Known Problems Sister     No Known Problems Brother     Hypertension Maternal Grandmother     Cataract Maternal Grandmother     No Known Problems Brother     No Known Problems Brother     Anesth Problems Neg Hx        Social History     Socioeconomic History    Marital status: SINGLE     Spouse name: Not on file    Number of children: Not on file    Years of education: Not on file    Highest education level: Not on file   Occupational History    Not on file   Tobacco Use    Smoking status: Never Smoker    Smokeless tobacco: Never Used   Substance and Sexual Activity    Alcohol use: Not on file     Comment: RARELY-DRINKS WINE    Drug use: Not Currently     Types: Marijuana     Comment: TEN YEARS AGO    Sexual activity: Not on file   Other Topics Concern    Not on file   Social History Narrative    Not on file     Social Determinants of Health     Financial Resource Strain:     Difficulty of Paying Living Expenses: Not on file   Food Insecurity:     Worried About Running Out of Food in the Last Year: Not on file    Melinda of Food in the Last Year: Not on file   Transportation Needs:     Lack of Transportation (Medical): Not on file    Lack of Transportation (Non-Medical): Not on file   Physical Activity:     Days of Exercise per Week: Not on file    Minutes of Exercise per Session: Not on file   Stress:     Feeling of Stress : Not on file   Social Connections:     Frequency of Communication with Friends and Family: Not on file    Frequency of Social Gatherings with Friends and Family: Not on file    Attends Mandaeism Services: Not on file    Active Member of [address] [place] [address] [employer] or Organizations: Not on file    Attends Club or Organization Meetings: Not on file    Marital Status: Not on file   Intimate Partner Violence:     Fear of Current or Ex-Partner: Not on file    Emotionally Abused: Not on file    Physically Abused: Not on file    Sexually Abused: Not on file   Housing Stability:     Unable to Pay for Housing in the Last Year: Not on file    Number of [place] in the Last Year: Not on file    Unstable Housing in the Last Year: Not on file         ALLERGIES: Patient has no known allergies. Review of Systems   Constitutional: Negative for fever. HENT: Negative for facial swelling. Eyes: Negative for visual disturbance. Respiratory: Positive for cough. Negative for chest tightness. Cardiovascular: Negative for chest pain. Gastrointestinal: Negative for abdominal pain. Genitourinary: Negative for difficulty urinating and dysuria. Musculoskeletal: Negative for arthralgias. Skin: Negative for rash. Neurological: Negative for headaches. Hematological: Negative for adenopathy. Psychiatric/Behavioral: Negative for suicidal ideas.        Vitals:    12/17/21 1058   BP: (!) 156/91   Pulse: 90   Resp: 18   Temp: 97.7 °F (36.5 °C)   SpO2: 98%   Weight: (!) 181.4 kg (400 lb)   Height: 5' 5\" (1.651 m) Physical Exam  Vitals and nursing note reviewed. Constitutional:       General: She is not in acute distress. Appearance: She is well-developed. HENT:      Head: Normocephalic and atraumatic. Eyes:      General: No scleral icterus. Conjunctiva/sclera: Conjunctivae normal.      Pupils: Pupils are equal, round, and reactive to light. Cardiovascular:      Rate and Rhythm: Normal rate. Heart sounds: No murmur heard. Pulmonary:      Effort: Pulmonary effort is normal. No respiratory distress. Abdominal:      General: There is no distension. Musculoskeletal:         General: Normal range of motion. Cervical back: Normal range of motion and neck supple. Skin:     General: Skin is warm and dry. Findings: No rash. Neurological:      Mental Status: She is alert and oriented to person, place, and time. MDM  Number of Diagnoses or Management Options  Cough  Early stage of pregnancy  Sore throat  Diagnosis management comments: Assessment: Symptoms of URI. Stable for symptomatic management. Discharge home.          Procedures

## 2021-12-17 NOTE — ED TRIAGE NOTES
Pt reports cough and sore throat x1 week. Pt is 11 weeks pregnant. Pt has been using OTC cough medications with no relief.

## 2022-02-02 NOTE — OP NOTES
02/02/22 0918   Social/Functional History   Lives With Alone   Home Layout One level   Home Access Stairs to enter with rails   Entrance Stairs - Number of Steps 2 steps to enter   Entrance Stairs - Rails Both   Bathroom Shower/Tub [employer] Grab bars in shower;Grab bars around toilet; Toilet raiser   Home Equipment Rolling walker   Receives Help From Family   2/2/2022: SS Note/Discharge planning:  Met with pt in [facility] for transition of care planning, pt having an elective total hip arthroplasty today 2/2 , reviewed rehab options for Medicare, pt plans to return home day of surgery from [facility], pt's dtr Silvia Alfred lives close by and will help him and transport him home, pt has dme needed other than a shower seat but declined private pay cost through [facility] and will get one on his own if needed, pt prefers [facility], referral made to [facility] , agency accepted referral and will follow with pt post-op to schedule home therapy visits for start of care tomorrow 2/3. [facility] notified. Electronically signed by Durand Gowers, LSW on 2/2/2022 at 9:17 AM [address] [facility]  OPERATIVE REPORT    Name:  Girish Perrin  MR#:  [id number]  :  1990  ACCOUNT #:  [de-identified]  DATE OF SERVICE:  2020    PREOPERATIVE DIAGNOSIS:  Symptomatic cholelithiasis. POSTOPERATIVE DIAGNOSIS:  Symptomatic cholelithiasis. PROCEDURE PERFORMED:  Laparoscopic cholecystectomy. SURGEON:  Sergio Russell MD    ASSISTANT:  Lindsay Porter MD    ANESTHESIA:  General endotracheal.    COMPLICATIONS:  None. SPECIMENS REMOVED:  Gallbladder to Pathology. IMPLANTS:  None. ESTIMATED BLOOD LOSS:  Approximately 60 mL. IV FLUIDS:  Crystalloid 1500 mL. DRAINS:  None. INDICATIONS FOR SURGERY:  The patient is a 52-year-old female with symptomatic cholelithiasis. Ms. Mario Terry is brought to the operating at this time for laparoscopic cholecystectomy and intraoperative cholangiogram.  The risks of the procedure including but not limited to, infection, bleeding, injury to the common bile duct and conversion to an open procedure were discussed in detail with the patient. Ms. Mario Terry understood and wished to proceed. PROCEDURE:  After consent was obtained, the patient was brought to the operating room where she was placed in the supine position on the operating room table. Following the induction of an adequate level of general anesthesia via the endotracheal tube, compression devices were placed on both lower extremities. The abdomen was prepped with ChloraPrep and draped as a sterile field. Local anesthetic was infiltrated and a small midline incision above the umbilicus was opened sharply. Using the 5-mm non-bladed dilating trocar, access to the peritoneal cavity was achieved under direct vision. After an adequate level of carbon dioxide pneumoperitoneum had been achieved, the laparoscope was inserted. Inspection of the peritoneal contents revealed no focal abnormalities.   Local anesthetic was infiltrated again and a 12-mm trocar and two 5-mm trocars were placed in the usual locations under direct vision. The operating room table was placed in the reverse Trendelenburg position and attention directed towards the gallbladder. Adhesions between the omentum and gallbladder were encountered and these were taken down with a combination of blunt and sharp dissection. The gallbladder was markedly distended and so it was decompressed with the needle aspirator. It should be noted that the bile that was aspirated was not hydropic. The gallbladder was thick-walled consistent with chronic cholecystitis. The gallbladder was grasped and attention directed towards the cystic duct. In the course of identifying the cystic duct, the node of Calot was identified. In order to improve exposure, the lymph node was dissected free, clipped proximally and distally and excised. The specimen was passed off the field and submitted for histopathologic evaluation. In order to improve exposure, local anesthetic was infiltrated and another 5-mm trocar was placed in the right mid abdomen under direct vision. The fan retractor was then placed through this trocar in order to improve exposure. Local anesthetic was infiltrated again and a midline incision several centimeters superior to the umbilicus was opened sharply. A 5-mm trocar was placed under direct vision and the laparoscope was moved to this port. The gallbladder was mobilized with the cautery and the cystic artery was identified. The gallbladder was mobilized further and the cystic duct was identified. This was dissected free circumferentially and followed into the gallbladder. The gallbladder was mobilized further and the critical view was obtained. The cystic artery was then divided between three clips proximally and one distally. Following this, the cystic duct was clipped twice proximally, once distally and divided. Using the hook electrocautery, the gallbladder was dissected off of the liver.   Several bleeders were either clipped or cauterized. The specimen was placed in an Endo Catch bag and placed over the liver. The gallbladder fossa was inspected and the clips on the cystic duct stump and the clips on the cystic artery stump were identified and found to be in place. The gallbladder fossa was irrigated copiously with saline and several bleeders cauterized. 3 g of Gayle were then placed in the gallbladder fossa. The peritoneal cavity was inspected again and felt to be hemostatic. No other abnormalities were noted and so the specimen was removed from the peritoneal cavity, passed off the field and submitted for histopathologic evaluation. The pneumoperitoneum was evacuated and the wounds were irrigated copiously with saline. The fascial defect at the 12-mm trocar site was closed with a 0 Vicryl figure-of-eight suture. This incision was then closed with two layers of running 0 Vicryl suture followed by 4-0 Monocryl subcuticular suture to the skin. The 5-mm trocar sites were closed with 4-0 Monocryl subcuticular suture to the skin. Additional local anesthetic was infiltrated and the incisions were dressed with Dermabond. The patient was awakened from her general anesthetic and extubated in the operating room. She was transferred to her bed and brought to the recovery room in stable condition having tolerated the procedure well. At the conclusion of the procedure, all sponge counts, instrument counts and needle counts were reported as correct x2. Dr. Adry Mcdaniels assistance was required due to the patient's morbid obesity with a BMI of 70.53 kg/sq m.       Kam Mascorro MD      [id number]  D:  05/08/2020 10:40  T:  05/08/2020 11:57  JOB #:  [id number]  CC:  Leone Salon, MD Floyd Primrose, MD

## 2022-03-19 PROBLEM — K80.20 CHOLELITHIASIS: Status: ACTIVE | Noted: 2020-05-08

## 2022-03-19 PROBLEM — E66.01 OBESITY, MORBID: Status: ACTIVE | Noted: 2020-05-04

## 2022-03-19 PROBLEM — K80.20 CALCULUS OF GALLBLADDER WITHOUT CHOLECYSTITIS WITHOUT OBSTRUCTION: Status: ACTIVE | Noted: 2020-05-04

## 2024-09-30 ENCOUNTER — HOSPITAL ENCOUNTER (EMERGENCY)
Facility: HOSPITAL | Age: 34
Discharge: HOME | End: 2024-09-30
Attending: EMERGENCY MEDICINE
Payer: MEDICAID

## 2024-09-30 ENCOUNTER — APPOINTMENT (OUTPATIENT)
Facility: HOSPITAL | Age: 34
End: 2024-09-30
Payer: MEDICAID

## 2024-09-30 VITALS
DIASTOLIC BLOOD PRESSURE: 88 MMHG | RESPIRATION RATE: 26 BRPM | SYSTOLIC BLOOD PRESSURE: 152 MMHG | TEMPERATURE: 98 F | HEART RATE: 84 BPM | OXYGEN SATURATION: 97 %

## 2024-09-30 DIAGNOSIS — K57.32 DIVERTICULITIS OF COLON: Primary | ICD-10-CM

## 2024-09-30 LAB
ALBUMIN SERPL-MCNC: 3 G/DL (ref 3.5–5)
ALBUMIN/GLOB SERPL: 0.6 (ref 1.1–2.2)
ALP SERPL-CCNC: 72 U/L (ref 45–117)
ALT SERPL-CCNC: 26 U/L (ref 12–78)
ANION GAP SERPL CALC-SCNC: 4 MMOL/L (ref 2–12)
APPEARANCE UR: CLEAR
AST SERPL-CCNC: 12 U/L (ref 15–37)
BACTERIA URNS QL MICRO: ABNORMAL /HPF
BASOPHILS # BLD: 0 K/UL (ref 0–0.1)
BASOPHILS NFR BLD: 0 % (ref 0–1)
BILIRUB SERPL-MCNC: 0.3 MG/DL (ref 0.2–1)
BILIRUB UR QL: NEGATIVE
BUN SERPL-MCNC: 8 MG/DL (ref 6–20)
BUN/CREAT SERPL: 11 (ref 12–20)
CALCIUM SERPL-MCNC: 8.9 MG/DL (ref 8.5–10.1)
CHLORIDE SERPL-SCNC: 104 MMOL/L (ref 97–108)
CO2 SERPL-SCNC: 28 MMOL/L (ref 21–32)
COLOR UR: ABNORMAL
COMMENT:: NORMAL
CREAT SERPL-MCNC: 0.74 MG/DL (ref 0.55–1.02)
DIFFERENTIAL METHOD BLD: ABNORMAL
EOSINOPHIL # BLD: 0.1 K/UL (ref 0–0.4)
EOSINOPHIL NFR BLD: 1 % (ref 0–7)
EPITH CASTS URNS QL MICRO: ABNORMAL /LPF
ERYTHROCYTE [DISTWIDTH] IN BLOOD BY AUTOMATED COUNT: 13.9 % (ref 11.5–14.5)
GLOBULIN SER CALC-MCNC: 5 G/DL (ref 2–4)
GLUCOSE SERPL-MCNC: 107 MG/DL (ref 65–100)
GLUCOSE UR STRIP.AUTO-MCNC: NEGATIVE MG/DL
HCG UR QL: NEGATIVE
HCT VFR BLD AUTO: 33.9 % (ref 35–47)
HGB BLD-MCNC: 10.6 G/DL (ref 11.5–16)
HGB UR QL STRIP: NEGATIVE
HYALINE CASTS URNS QL MICRO: ABNORMAL /LPF (ref 0–5)
IMM GRANULOCYTES # BLD AUTO: 0 K/UL (ref 0–0.04)
IMM GRANULOCYTES NFR BLD AUTO: 0 % (ref 0–0.5)
KETONES UR QL STRIP.AUTO: NEGATIVE MG/DL
LEUKOCYTE ESTERASE UR QL STRIP.AUTO: NEGATIVE
LIPASE SERPL-CCNC: 29 U/L (ref 13–75)
LYMPHOCYTES # BLD: 1.7 K/UL (ref 0.8–3.5)
LYMPHOCYTES NFR BLD: 19 % (ref 12–49)
MCH RBC QN AUTO: 26.8 PG (ref 26–34)
MCHC RBC AUTO-ENTMCNC: 31.3 G/DL (ref 30–36.5)
MCV RBC AUTO: 85.8 FL (ref 80–99)
MONOCYTES # BLD: 0.5 K/UL (ref 0–1)
MONOCYTES NFR BLD: 5 % (ref 5–13)
NEUTS SEG # BLD: 6.7 K/UL (ref 1.8–8)
NEUTS SEG NFR BLD: 75 % (ref 32–75)
NITRITE UR QL STRIP.AUTO: NEGATIVE
NRBC # BLD: 0 K/UL (ref 0–0.01)
NRBC BLD-RTO: 0 PER 100 WBC
PH UR STRIP: 6 (ref 5–8)
PLATELET # BLD AUTO: 309 K/UL (ref 150–400)
PMV BLD AUTO: 9.9 FL (ref 8.9–12.9)
POTASSIUM SERPL-SCNC: 3.6 MMOL/L (ref 3.5–5.1)
PROT SERPL-MCNC: 8 G/DL (ref 6.4–8.2)
PROT UR STRIP-MCNC: NEGATIVE MG/DL
RBC # BLD AUTO: 3.95 M/UL (ref 3.8–5.2)
RBC #/AREA URNS HPF: ABNORMAL /HPF (ref 0–5)
SODIUM SERPL-SCNC: 136 MMOL/L (ref 136–145)
SP GR UR REFRACTOMETRY: 1.01 (ref 1–1.03)
SPECIMEN HOLD: NORMAL
SPECIMEN HOLD: NORMAL
UROBILINOGEN UR QL STRIP.AUTO: 0.2 EU/DL (ref 0.2–1)
WBC # BLD AUTO: 9.1 K/UL (ref 3.6–11)
WBC URNS QL MICRO: ABNORMAL /HPF (ref 0–4)

## 2024-09-30 PROCEDURE — 81025 URINE PREGNANCY TEST: CPT

## 2024-09-30 PROCEDURE — 80053 COMPREHEN METABOLIC PANEL: CPT

## 2024-09-30 PROCEDURE — 74177 CT ABD & PELVIS W/CONTRAST: CPT

## 2024-09-30 PROCEDURE — 83690 ASSAY OF LIPASE: CPT

## 2024-09-30 PROCEDURE — 81001 URINALYSIS AUTO W/SCOPE: CPT

## 2024-09-30 PROCEDURE — 6360000004 HC RX CONTRAST MEDICATION: Performed by: RADIOLOGY

## 2024-09-30 PROCEDURE — 85025 COMPLETE CBC W/AUTO DIFF WBC: CPT

## 2024-09-30 PROCEDURE — 2580000003 HC RX 258: Performed by: EMERGENCY MEDICINE

## 2024-09-30 PROCEDURE — 36415 COLL VENOUS BLD VENIPUNCTURE: CPT

## 2024-09-30 PROCEDURE — 99285 EMERGENCY DEPT VISIT HI MDM: CPT

## 2024-09-30 PROCEDURE — 6360000002 HC RX W HCPCS: Performed by: EMERGENCY MEDICINE

## 2024-09-30 PROCEDURE — 96374 THER/PROPH/DIAG INJ IV PUSH: CPT

## 2024-09-30 RX ORDER — 0.9 % SODIUM CHLORIDE 0.9 %
1000 INTRAVENOUS SOLUTION INTRAVENOUS ONCE
Status: COMPLETED | OUTPATIENT
Start: 2024-09-30 | End: 2024-09-30

## 2024-09-30 RX ORDER — IOPAMIDOL 755 MG/ML
100 INJECTION, SOLUTION INTRAVASCULAR
Status: COMPLETED | OUTPATIENT
Start: 2024-09-30 | End: 2024-09-30

## 2024-09-30 RX ORDER — KETOROLAC TROMETHAMINE 30 MG/ML
15 INJECTION, SOLUTION INTRAMUSCULAR; INTRAVENOUS ONCE
Status: COMPLETED | OUTPATIENT
Start: 2024-09-30 | End: 2024-09-30

## 2024-09-30 RX ORDER — DICYCLOMINE HCL 20 MG
20 TABLET ORAL 4 TIMES DAILY PRN
Qty: 20 TABLET | Refills: 0 | Status: SHIPPED | OUTPATIENT
Start: 2024-09-30

## 2024-09-30 RX ADMIN — SODIUM CHLORIDE 1000 ML: 9 INJECTION, SOLUTION INTRAVENOUS at 01:59

## 2024-09-30 RX ADMIN — KETOROLAC TROMETHAMINE 15 MG: 30 INJECTION, SOLUTION INTRAMUSCULAR at 02:11

## 2024-09-30 RX ADMIN — IOPAMIDOL 100 ML: 755 INJECTION, SOLUTION INTRAVENOUS at 04:27

## 2024-09-30 ASSESSMENT — PAIN - FUNCTIONAL ASSESSMENT
PAIN_FUNCTIONAL_ASSESSMENT: ACTIVITIES ARE NOT PREVENTED
PAIN_FUNCTIONAL_ASSESSMENT: 0-10
PAIN_FUNCTIONAL_ASSESSMENT: ACTIVITIES ARE NOT PREVENTED

## 2024-09-30 ASSESSMENT — PAIN DESCRIPTION - LOCATION
LOCATION: ABDOMEN

## 2024-09-30 ASSESSMENT — PAIN SCALES - GENERAL
PAINLEVEL_OUTOF10: 8
PAINLEVEL_OUTOF10: 9
PAINLEVEL_OUTOF10: 2

## 2024-09-30 ASSESSMENT — PAIN DESCRIPTION - ORIENTATION
ORIENTATION: LEFT
ORIENTATION: RIGHT

## 2024-09-30 ASSESSMENT — PAIN DESCRIPTION - DESCRIPTORS
DESCRIPTORS: DULL;DISCOMFORT
DESCRIPTORS: SHARP;SHOOTING;JABBING

## 2024-09-30 ASSESSMENT — PAIN DESCRIPTION - PAIN TYPE: TYPE: ACUTE PAIN

## 2024-09-30 NOTE — ED TRIAGE NOTES
Pt arrives from home with cc of lower abd pain since Friday. She initially thought she had some GI bug but the pain is constant and persistent.     Pain worsens with movement.     Denies urinary symptoms, vaginal bleeding, nausea, vomiting.    Hx of cholecystectomy.

## 2024-09-30 NOTE — ED PROVIDER NOTES
[facility] EMERGENCY DEP  EMERGENCY DEPARTMENT ENCOUNTER      Pt Name: Gab Powell  MRN: [id number]  Birthdate 1990  Date of evaluation: 2024  Provider: Dc Amezquita MD    CHIEF COMPLAINT       Chief Complaint   Patient presents with    Abdominal Pain         HISTORY OF PRESENT ILLNESS   (Location/Symptom, Timing/Onset, Context/Setting, Quality, Duration, Modifying Factors, Severity)  Note limiting factors.   34-year-old with morbid obesity.  She presents with complaints of a 2-day history of abdominal pain.  She states that the pain was across her lower abdomen when it began.  Now it is mostly localized to the left side of her abdomen.  It does not radiate.  It waxes and wanes in intensity.  She denies any urinary symptoms.  No nausea or vomiting.  Her appetite has been decreased, but she has been able to eat and drink.  She has tried Pepto-Bismol, Advil, and ginger ale with limited relief.  She states that her last normal bowel movement was 2 days ago.  She tried drinking coffee to get her bowels to move without success.  She has a history of a cholecystectomy.          Review of External Medical Records:     Nursing Notes were reviewed.    REVIEW OF SYSTEMS    (2-9 systems for level 4, 10 or more for level 5)     Review of Systems    Except as noted above the remainder of the review of systems was reviewed and negative.       PAST MEDICAL HISTORY     Past Medical History:   Diagnosis Date    Calculus of gallbladder without cholecystitis without obstruction 2020    Morbid obesity     Nausea & vomiting          SURGICAL HISTORY       Past Surgical History:   Procedure Laterality Date     SECTION  2012     SECTION  2016    CHOLECYSTECTOMY, LAPAROSCOPIC  2020         CURRENT MEDICATIONS       Previous Medications    No medications on file       ALLERGIES     Patient has no known allergies.    FAMILY HISTORY       Family History   Problem Relation Age of Onset    No

## 2024-09-30 NOTE — ED NOTES
Patient discharged by Bia MANUEL - pt sent to the front lobby, with strong and steady gait, no acute distress noted at time of discharge - Discharge information / home RX / and reasons to return to the ED were reviewed by the ED provider.

## 2024-11-06 ENCOUNTER — APPOINTMENT (OUTPATIENT)
Facility: HOSPITAL | Age: 34
End: 2024-11-06
Payer: MEDICAID

## 2024-11-06 ENCOUNTER — HOSPITAL ENCOUNTER (INPATIENT)
Facility: HOSPITAL | Age: 34
LOS: 2 days | Discharge: HOME | End: 2024-11-09
Attending: STUDENT IN AN ORGANIZED HEALTH CARE EDUCATION/TRAINING PROGRAM | Admitting: FAMILY MEDICINE
Payer: MEDICAID

## 2024-11-06 DIAGNOSIS — J18.9 COMMUNITY ACQUIRED PNEUMONIA OF RIGHT LUNG, UNSPECIFIED PART OF LUNG: Primary | ICD-10-CM

## 2024-11-06 DIAGNOSIS — R09.02 HYPOXIA: ICD-10-CM

## 2024-11-06 LAB
ALBUMIN SERPL-MCNC: 3 G/DL (ref 3.5–5)
ALBUMIN/GLOB SERPL: 0.7 (ref 1.1–2.2)
ALP SERPL-CCNC: 73 U/L (ref 45–117)
ALT SERPL-CCNC: 29 U/L (ref 12–78)
ANION GAP SERPL CALC-SCNC: 7 MMOL/L (ref 2–12)
AST SERPL-CCNC: 29 U/L (ref 15–37)
BASOPHILS # BLD: 0 K/UL (ref 0–0.1)
BASOPHILS NFR BLD: 0 % (ref 0–1)
BILIRUB SERPL-MCNC: 0.5 MG/DL (ref 0.2–1)
BUN SERPL-MCNC: 5 MG/DL (ref 6–20)
BUN/CREAT SERPL: 7 (ref 12–20)
CALCIUM SERPL-MCNC: 8.6 MG/DL (ref 8.5–10.1)
CHLORIDE SERPL-SCNC: 103 MMOL/L (ref 97–108)
CO2 SERPL-SCNC: 27 MMOL/L (ref 21–32)
COMMENT:: NORMAL
CREAT SERPL-MCNC: 0.74 MG/DL (ref 0.55–1.02)
D DIMER PPP FEU-MCNC: 1.23 MG/L FEU (ref 0–0.65)
DIFFERENTIAL METHOD BLD: ABNORMAL
EOSINOPHIL # BLD: 0.1 K/UL (ref 0–0.4)
EOSINOPHIL NFR BLD: 1 % (ref 0–7)
ERYTHROCYTE [DISTWIDTH] IN BLOOD BY AUTOMATED COUNT: 13.9 % (ref 11.5–14.5)
FLUAV RNA SPEC QL NAA+PROBE: NOT DETECTED
FLUBV RNA SPEC QL NAA+PROBE: NOT DETECTED
GLOBULIN SER CALC-MCNC: 4.4 G/DL (ref 2–4)
GLUCOSE SERPL-MCNC: 100 MG/DL (ref 65–100)
HCT VFR BLD AUTO: 34.5 % (ref 35–47)
HGB BLD-MCNC: 11.1 G/DL (ref 11.5–16)
IMM GRANULOCYTES # BLD AUTO: 0.1 K/UL (ref 0–0.04)
IMM GRANULOCYTES NFR BLD AUTO: 1 % (ref 0–0.5)
LYMPHOCYTES # BLD: 0.6 K/UL (ref 0.8–3.5)
LYMPHOCYTES NFR BLD: 9 % (ref 12–49)
MCH RBC QN AUTO: 27.1 PG (ref 26–34)
MCHC RBC AUTO-ENTMCNC: 32.2 G/DL (ref 30–36.5)
MCV RBC AUTO: 84.1 FL (ref 80–99)
MONOCYTES # BLD: 0.3 K/UL (ref 0–1)
MONOCYTES NFR BLD: 5 % (ref 5–13)
NEUTS SEG # BLD: 5.1 K/UL (ref 1.8–8)
NEUTS SEG NFR BLD: 84 % (ref 32–75)
NRBC # BLD: 0 K/UL (ref 0–0.01)
NRBC BLD-RTO: 0 PER 100 WBC
PLATELET # BLD AUTO: 281 K/UL (ref 150–400)
PMV BLD AUTO: 9.9 FL (ref 8.9–12.9)
POTASSIUM SERPL-SCNC: 3.3 MMOL/L (ref 3.5–5.1)
PROT SERPL-MCNC: 7.4 G/DL (ref 6.4–8.2)
RBC # BLD AUTO: 4.1 M/UL (ref 3.8–5.2)
RBC MORPH BLD: ABNORMAL
S PYO DNA THROAT QL NAA+PROBE: NOT DETECTED
SARS-COV-2 RNA RESP QL NAA+PROBE: NOT DETECTED
SODIUM SERPL-SCNC: 137 MMOL/L (ref 136–145)
SOURCE: NORMAL
SPECIMEN HOLD: NORMAL
TROPONIN I SERPL HS-MCNC: 12 NG/L (ref 0–51)
WBC # BLD AUTO: 6.2 K/UL (ref 3.6–11)

## 2024-11-06 PROCEDURE — 71275 CT ANGIOGRAPHY CHEST: CPT

## 2024-11-06 PROCEDURE — 85025 COMPLETE CBC W/AUTO DIFF WBC: CPT

## 2024-11-06 PROCEDURE — 87636 SARSCOV2 & INF A&B AMP PRB: CPT

## 2024-11-06 PROCEDURE — 93005 ELECTROCARDIOGRAM TRACING: CPT | Performed by: STUDENT IN AN ORGANIZED HEALTH CARE EDUCATION/TRAINING PROGRAM

## 2024-11-06 PROCEDURE — 85379 FIBRIN DEGRADATION QUANT: CPT

## 2024-11-06 PROCEDURE — 80053 COMPREHEN METABOLIC PANEL: CPT

## 2024-11-06 PROCEDURE — 87651 STREP A DNA AMP PROBE: CPT

## 2024-11-06 PROCEDURE — 6360000002 HC RX W HCPCS: Performed by: PHYSICIAN ASSISTANT

## 2024-11-06 PROCEDURE — 99285 EMERGENCY DEPT VISIT HI MDM: CPT

## 2024-11-06 PROCEDURE — 6370000000 HC RX 637 (ALT 250 FOR IP): Performed by: PHYSICIAN ASSISTANT

## 2024-11-06 PROCEDURE — 84484 ASSAY OF TROPONIN QUANT: CPT

## 2024-11-06 PROCEDURE — 6360000004 HC RX CONTRAST MEDICATION: Performed by: PHYSICIAN ASSISTANT

## 2024-11-06 PROCEDURE — 36415 COLL VENOUS BLD VENIPUNCTURE: CPT

## 2024-11-06 PROCEDURE — 96375 TX/PRO/DX INJ NEW DRUG ADDON: CPT

## 2024-11-06 RX ORDER — ACETAMINOPHEN 325 MG/1
650 TABLET ORAL ONCE
Status: COMPLETED | OUTPATIENT
Start: 2024-11-06 | End: 2024-11-06

## 2024-11-06 RX ORDER — ONDANSETRON 2 MG/ML
4 INJECTION INTRAMUSCULAR; INTRAVENOUS ONCE
Status: COMPLETED | OUTPATIENT
Start: 2024-11-06 | End: 2024-11-06

## 2024-11-06 RX ORDER — IOPAMIDOL 755 MG/ML
100 INJECTION, SOLUTION INTRAVASCULAR
Status: COMPLETED | OUTPATIENT
Start: 2024-11-06 | End: 2024-11-06

## 2024-11-06 RX ADMIN — IOPAMIDOL 100 ML: 755 INJECTION, SOLUTION INTRAVENOUS at 22:46

## 2024-11-06 RX ADMIN — ACETAMINOPHEN 650 MG: 325 TABLET ORAL at 21:27

## 2024-11-06 RX ADMIN — ONDANSETRON HYDROCHLORIDE 4 MG: 2 INJECTION, SOLUTION INTRAMUSCULAR; INTRAVENOUS at 21:27

## 2024-11-06 ASSESSMENT — LIFESTYLE VARIABLES
HOW OFTEN DO YOU HAVE A DRINK CONTAINING ALCOHOL: MONTHLY OR LESS
HOW MANY STANDARD DRINKS CONTAINING ALCOHOL DO YOU HAVE ON A TYPICAL DAY: 1 OR 2

## 2024-11-06 ASSESSMENT — PAIN SCALES - GENERAL
PAINLEVEL_OUTOF10: 10

## 2024-11-06 ASSESSMENT — PAIN DESCRIPTION - ONSET: ONSET: ON-GOING

## 2024-11-06 ASSESSMENT — PAIN DESCRIPTION - LOCATION: LOCATION: CHEST

## 2024-11-06 ASSESSMENT — PAIN - FUNCTIONAL ASSESSMENT
PAIN_FUNCTIONAL_ASSESSMENT: 0-10
PAIN_FUNCTIONAL_ASSESSMENT: PREVENTS OR INTERFERES SOME ACTIVE ACTIVITIES AND ADLS

## 2024-11-06 ASSESSMENT — PAIN DESCRIPTION - PAIN TYPE: TYPE: ACUTE PAIN

## 2024-11-06 ASSESSMENT — PAIN DESCRIPTION - FREQUENCY: FREQUENCY: CONTINUOUS

## 2024-11-06 ASSESSMENT — PAIN DESCRIPTION - DESCRIPTORS: DESCRIPTORS: SORE

## 2024-11-06 ASSESSMENT — PAIN DESCRIPTION - ORIENTATION: ORIENTATION: MID

## 2024-11-07 PROBLEM — J96.01 ACUTE HYPOXIC RESPIRATORY FAILURE: Status: ACTIVE | Noted: 2024-11-07

## 2024-11-07 LAB
B PERT DNA SPEC QL NAA+PROBE: NOT DETECTED
BASOPHILS # BLD: 0 K/UL (ref 0–0.1)
BASOPHILS NFR BLD: 0 % (ref 0–1)
BORDETELLA PARAPERTUSSIS BY PCR: NOT DETECTED
C PNEUM DNA SPEC QL NAA+PROBE: NOT DETECTED
COMMENT:: NORMAL
DIFFERENTIAL METHOD BLD: ABNORMAL
EKG ATRIAL RATE: 109 BPM
EKG DIAGNOSIS: NORMAL
EKG P AXIS: 46 DEGREES
EKG P-R INTERVAL: 176 MS
EKG Q-T INTERVAL: 328 MS
EKG QRS DURATION: 92 MS
EKG QTC CALCULATION (BAZETT): 441 MS
EKG R AXIS: 27 DEGREES
EKG T AXIS: 14 DEGREES
EKG VENTRICULAR RATE: 109 BPM
EOSINOPHIL # BLD: 0.1 K/UL (ref 0–0.4)
EOSINOPHIL NFR BLD: 1 % (ref 0–7)
ERYTHROCYTE [DISTWIDTH] IN BLOOD BY AUTOMATED COUNT: 14 % (ref 11.5–14.5)
FLUAV SUBTYP SPEC NAA+PROBE: NOT DETECTED
FLUBV RNA SPEC QL NAA+PROBE: NOT DETECTED
HADV DNA SPEC QL NAA+PROBE: NOT DETECTED
HCOV 229E RNA SPEC QL NAA+PROBE: NOT DETECTED
HCOV HKU1 RNA SPEC QL NAA+PROBE: NOT DETECTED
HCOV NL63 RNA SPEC QL NAA+PROBE: NOT DETECTED
HCOV OC43 RNA SPEC QL NAA+PROBE: NOT DETECTED
HCT VFR BLD AUTO: 33.5 % (ref 35–47)
HGB BLD-MCNC: 10.6 G/DL (ref 11.5–16)
HMPV RNA SPEC QL NAA+PROBE: NOT DETECTED
HPIV1 RNA SPEC QL NAA+PROBE: NOT DETECTED
HPIV2 RNA SPEC QL NAA+PROBE: NOT DETECTED
HPIV3 RNA SPEC QL NAA+PROBE: NOT DETECTED
HPIV4 RNA SPEC QL NAA+PROBE: NOT DETECTED
IMM GRANULOCYTES # BLD AUTO: 0 K/UL (ref 0–0.04)
IMM GRANULOCYTES NFR BLD AUTO: 0 % (ref 0–0.5)
LACTATE SERPL-SCNC: 0.8 MMOL/L (ref 0.4–2)
LACTATE SERPL-SCNC: 1 MMOL/L (ref 0.4–2)
LYMPHOCYTES # BLD: 0.6 K/UL (ref 0.8–3.5)
LYMPHOCYTES NFR BLD: 8 % (ref 12–49)
M PNEUMO DNA SPEC QL NAA+PROBE: NOT DETECTED
MCH RBC QN AUTO: 26.9 PG (ref 26–34)
MCHC RBC AUTO-ENTMCNC: 31.6 G/DL (ref 30–36.5)
MCV RBC AUTO: 85 FL (ref 80–99)
MONOCYTES # BLD: 0.4 K/UL (ref 0–1)
MONOCYTES NFR BLD: 6 % (ref 5–13)
NEUTS SEG # BLD: 6 K/UL (ref 1.8–8)
NEUTS SEG NFR BLD: 85 % (ref 32–75)
NRBC # BLD: 0 K/UL (ref 0–0.01)
NRBC BLD-RTO: 0 PER 100 WBC
PLATELET # BLD AUTO: 272 K/UL (ref 150–400)
PMV BLD AUTO: 9.9 FL (ref 8.9–12.9)
RBC # BLD AUTO: 3.94 M/UL (ref 3.8–5.2)
RBC MORPH BLD: ABNORMAL
RSV RNA SPEC QL NAA+PROBE: NOT DETECTED
RV+EV RNA SPEC QL NAA+PROBE: DETECTED
SARS-COV-2 RNA RESP QL NAA+PROBE: NOT DETECTED
SPECIMEN HOLD: NORMAL
WBC # BLD AUTO: 7.1 K/UL (ref 3.6–11)

## 2024-11-07 PROCEDURE — 6360000002 HC RX W HCPCS: Performed by: FAMILY MEDICINE

## 2024-11-07 PROCEDURE — 87040 BLOOD CULTURE FOR BACTERIA: CPT

## 2024-11-07 PROCEDURE — 6370000000 HC RX 637 (ALT 250 FOR IP): Performed by: FAMILY MEDICINE

## 2024-11-07 PROCEDURE — 85025 COMPLETE CBC W/AUTO DIFF WBC: CPT

## 2024-11-07 PROCEDURE — 2580000003 HC RX 258: Performed by: PHYSICIAN ASSISTANT

## 2024-11-07 PROCEDURE — 83605 ASSAY OF LACTIC ACID: CPT

## 2024-11-07 PROCEDURE — 2580000003 HC RX 258: Performed by: FAMILY MEDICINE

## 2024-11-07 PROCEDURE — 6370000000 HC RX 637 (ALT 250 FOR IP): Performed by: HOSPITALIST

## 2024-11-07 PROCEDURE — 2060000000 HC ICU INTERMEDIATE R&B

## 2024-11-07 PROCEDURE — 36415 COLL VENOUS BLD VENIPUNCTURE: CPT

## 2024-11-07 PROCEDURE — 96375 TX/PRO/DX INJ NEW DRUG ADDON: CPT

## 2024-11-07 PROCEDURE — 96365 THER/PROPH/DIAG IV INF INIT: CPT

## 2024-11-07 PROCEDURE — 6360000002 HC RX W HCPCS: Performed by: PHYSICIAN ASSISTANT

## 2024-11-07 PROCEDURE — 0202U NFCT DS 22 TRGT SARS-COV-2: CPT

## 2024-11-07 RX ORDER — IBUPROFEN 400 MG/1
400 TABLET, FILM COATED ORAL EVERY 6 HOURS PRN
Status: DISCONTINUED | OUTPATIENT
Start: 2024-11-07 | End: 2024-11-09 | Stop reason: HOSPADM

## 2024-11-07 RX ORDER — GUAIFENESIN/DEXTROMETHORPHAN 100-10MG/5
10 SYRUP ORAL 4 TIMES DAILY
Status: COMPLETED | OUTPATIENT
Start: 2024-11-07 | End: 2024-11-09

## 2024-11-07 RX ORDER — ACETAMINOPHEN 325 MG/1
650 TABLET ORAL EVERY 6 HOURS PRN
Status: DISCONTINUED | OUTPATIENT
Start: 2024-11-07 | End: 2024-11-09 | Stop reason: HOSPADM

## 2024-11-07 RX ORDER — ONDANSETRON 4 MG/1
4 TABLET, ORALLY DISINTEGRATING ORAL EVERY 8 HOURS PRN
Status: DISCONTINUED | OUTPATIENT
Start: 2024-11-07 | End: 2024-11-09 | Stop reason: HOSPADM

## 2024-11-07 RX ORDER — MAGNESIUM SULFATE IN WATER 40 MG/ML
2000 INJECTION, SOLUTION INTRAVENOUS PRN
Status: DISCONTINUED | OUTPATIENT
Start: 2024-11-07 | End: 2024-11-09 | Stop reason: HOSPADM

## 2024-11-07 RX ORDER — POLYETHYLENE GLYCOL 3350 17 G/17G
17 POWDER, FOR SOLUTION ORAL DAILY PRN
Status: DISCONTINUED | OUTPATIENT
Start: 2024-11-07 | End: 2024-11-09 | Stop reason: HOSPADM

## 2024-11-07 RX ORDER — ENOXAPARIN SODIUM 100 MG/ML
60 INJECTION SUBCUTANEOUS 2 TIMES DAILY
Status: DISCONTINUED | OUTPATIENT
Start: 2024-11-07 | End: 2024-11-09 | Stop reason: HOSPADM

## 2024-11-07 RX ORDER — POTASSIUM CHLORIDE 7.45 MG/ML
10 INJECTION INTRAVENOUS PRN
Status: DISCONTINUED | OUTPATIENT
Start: 2024-11-07 | End: 2024-11-09 | Stop reason: HOSPADM

## 2024-11-07 RX ORDER — SODIUM CHLORIDE 0.9 % (FLUSH) 0.9 %
5-40 SYRINGE (ML) INJECTION EVERY 12 HOURS SCHEDULED
Status: DISCONTINUED | OUTPATIENT
Start: 2024-11-07 | End: 2024-11-09 | Stop reason: HOSPADM

## 2024-11-07 RX ORDER — SODIUM CHLORIDE 9 MG/ML
INJECTION, SOLUTION INTRAVENOUS PRN
Status: DISCONTINUED | OUTPATIENT
Start: 2024-11-07 | End: 2024-11-09 | Stop reason: HOSPADM

## 2024-11-07 RX ORDER — SODIUM CHLORIDE 0.9 % (FLUSH) 0.9 %
5-40 SYRINGE (ML) INJECTION PRN
Status: DISCONTINUED | OUTPATIENT
Start: 2024-11-07 | End: 2024-11-09 | Stop reason: HOSPADM

## 2024-11-07 RX ORDER — POTASSIUM CHLORIDE 750 MG/1
40 TABLET, EXTENDED RELEASE ORAL PRN
Status: DISCONTINUED | OUTPATIENT
Start: 2024-11-07 | End: 2024-11-09 | Stop reason: HOSPADM

## 2024-11-07 RX ORDER — ACETAMINOPHEN 650 MG/1
650 SUPPOSITORY RECTAL EVERY 6 HOURS PRN
Status: DISCONTINUED | OUTPATIENT
Start: 2024-11-07 | End: 2024-11-09 | Stop reason: HOSPADM

## 2024-11-07 RX ORDER — ONDANSETRON 2 MG/ML
4 INJECTION INTRAMUSCULAR; INTRAVENOUS EVERY 6 HOURS PRN
Status: DISCONTINUED | OUTPATIENT
Start: 2024-11-07 | End: 2024-11-09 | Stop reason: HOSPADM

## 2024-11-07 RX ADMIN — AZITHROMYCIN MONOHYDRATE 250 MG: 500 INJECTION, POWDER, LYOPHILIZED, FOR SOLUTION INTRAVENOUS at 23:07

## 2024-11-07 RX ADMIN — ACETAMINOPHEN 650 MG: 325 TABLET ORAL at 23:44

## 2024-11-07 RX ADMIN — SODIUM CHLORIDE, PRESERVATIVE FREE 10 ML: 5 INJECTION INTRAVENOUS at 21:20

## 2024-11-07 RX ADMIN — ACETAMINOPHEN 650 MG: 325 TABLET ORAL at 17:15

## 2024-11-07 RX ADMIN — ONDANSETRON 4 MG: 2 INJECTION INTRAMUSCULAR; INTRAVENOUS at 09:32

## 2024-11-07 RX ADMIN — IBUPROFEN 400 MG: 400 TABLET, FILM COATED ORAL at 08:02

## 2024-11-07 RX ADMIN — GUAIFENESIN AND DEXTROMETHORPHAN 10 ML: 100; 10 SYRUP ORAL at 21:20

## 2024-11-07 RX ADMIN — ENOXAPARIN SODIUM 60 MG: 100 INJECTION SUBCUTANEOUS at 08:02

## 2024-11-07 RX ADMIN — SODIUM CHLORIDE, PRESERVATIVE FREE 10 ML: 5 INJECTION INTRAVENOUS at 09:33

## 2024-11-07 RX ADMIN — WATER 1000 MG: 1 INJECTION INTRAMUSCULAR; INTRAVENOUS; SUBCUTANEOUS at 23:06

## 2024-11-07 RX ADMIN — GUAIFENESIN AND DEXTROMETHORPHAN 10 ML: 100; 10 SYRUP ORAL at 14:52

## 2024-11-07 RX ADMIN — ACETAMINOPHEN 650 MG: 325 TABLET ORAL at 02:38

## 2024-11-07 RX ADMIN — AZITHROMYCIN MONOHYDRATE 500 MG: 500 INJECTION, POWDER, LYOPHILIZED, FOR SOLUTION INTRAVENOUS at 00:32

## 2024-11-07 RX ADMIN — POTASSIUM BICARBONATE 40 MEQ: 782 TABLET, EFFERVESCENT ORAL at 02:38

## 2024-11-07 RX ADMIN — ACETAMINOPHEN 650 MG: 325 TABLET ORAL at 09:36

## 2024-11-07 RX ADMIN — ENOXAPARIN SODIUM 60 MG: 100 INJECTION SUBCUTANEOUS at 21:20

## 2024-11-07 RX ADMIN — WATER 1000 MG: 1 INJECTION INTRAMUSCULAR; INTRAVENOUS; SUBCUTANEOUS at 00:32

## 2024-11-07 RX ADMIN — GUAIFENESIN AND DEXTROMETHORPHAN 10 ML: 100; 10 SYRUP ORAL at 17:11

## 2024-11-07 ASSESSMENT — PAIN SCALES - GENERAL
PAINLEVEL_OUTOF10: 8
PAINLEVEL_OUTOF10: 2
PAINLEVEL_OUTOF10: 5
PAINLEVEL_OUTOF10: 5
PAINLEVEL_OUTOF10: 9

## 2024-11-07 ASSESSMENT — ENCOUNTER SYMPTOMS
SHORTNESS OF BREATH: 1
COUGH: 1

## 2024-11-07 ASSESSMENT — PAIN DESCRIPTION - DESCRIPTORS: DESCRIPTORS: ACHING

## 2024-11-07 ASSESSMENT — PAIN DESCRIPTION - LOCATION
LOCATION: HEAD

## 2024-11-07 ASSESSMENT — PAIN DESCRIPTION - ORIENTATION: ORIENTATION: RIGHT;LEFT;ANTERIOR

## 2024-11-07 ASSESSMENT — PAIN - FUNCTIONAL ASSESSMENT: PAIN_FUNCTIONAL_ASSESSMENT: NONE - DENIES PAIN

## 2024-11-07 NOTE — ED NOTES
2115 Patient assessed at this time. NC 2l placed on pt due to low o2 in 80s.  2230 Patient helped to  restroom became extremely SOB, helped back to room in wheelchair and O2 check 85%, NC 2l placed back on pt at this time. MD made aware.  0000 Patient placed on purewick at this time.   0230 Patient stated she felt extremely cold, temp checked 102.3 all extra clothing removed and meds given. MD also given update, see orders.   0345 Patient temp decreased to 100.6, no signs of distress.

## 2024-11-07 NOTE — H&P
History and Physical    Date of Service:  2024  Primary Care Provider: No primary care provider on file.  Source of information: patient, electronic medical record    Chief Complaint: Shortness of Breath (/) and Chills      History of Presenting Illness:   Gab Powell is a 34 y.o. female with a hx morbid obesity who presents with several days of cough, chills, and malaise, and is being admitted for pneumonia.  She states that two of her children have been sick with URI sx.    In the ED, she was febrile to 102.3, and tachycardic to 117.  She had hypoxia to high 80's improved to 97% on 2Lnc.  Labs showed d-dimer 1.23.  CTA thorax showed right middle, and right lower lobe consolidation with no evidence of PE.    In the ED, she received ceftriaxone, and azithromycin         REVIEW OF SYSTEMS:  A comprehensive review of systems was negative except for that written in the History of Present Illness.     Past Medical History:   Diagnosis Date    Calculus of gallbladder without cholecystitis without obstruction 2020    Morbid obesity     Nausea & vomiting       Past Surgical History:   Procedure Laterality Date     SECTION  2012     SECTION  2016    CHOLECYSTECTOMY, LAPAROSCOPIC  2020     Prior to Admission medications    Medication Sig Start Date End Date Taking? Authorizing Provider   dicyclomine (BENTYL) 20 MG tablet Take 1 tablet by mouth 4 times daily as needed (Abdominal pain) 24   Dc Amezquita MD     No Known Allergies   Family History   Problem Relation Age of Onset    No Known Problems Brother     Anesth Problems Neg Hx     No Known Problems Brother     Cataracts Maternal Grandmother     Hypertension Maternal Grandmother     No Known Problems Brother     No Known Problems Sister     Hypertension Father     Bipolar Disorder Father     Other Mother         SMOKER      Social History:  reports that she has never smoked. She has never used smokeless

## 2024-11-07 NOTE — ED PROVIDER NOTES
[facility] EMERGENCY DEP  EMERGENCY DEPARTMENT ENCOUNTER      Pt Name: Gab Powell  MRN: [id number]  Birthdate 1990  Date of evaluation: 2024  Provider: FERNANDO Chavez    CHIEF COMPLAINT       Chief Complaint   Patient presents with    Shortness of Breath           Chills         HISTORY OF PRESENT ILLNESS   (Location/Symptom, Timing/Onset, Context/Setting, Quality, Duration, Modifying Factors, Severity)  Note limiting factors.     34-year-old female presenting to the ED for cough.  Patient reports that recently both her kids were sick with \"walking pneumonia.\"  This was about 2 to 2.5 weeks ago.  Patient started on Monday, 2 days ago, with cough, then chills yesterday.  Started vomiting last night.  Patient tried Zofran at home without much relief.  No diarrhea.  Able to tolerate liquids.  Patient reports that today she coughed and saw some blood.  + Chest pain and shortness of breath.  \"It hurts to speak.\"  Denies history of VTE or recent immobilization.  No pain or swelling in the legs.  No exogenous estrogen.  Has felt hot at home.  Had ibuprofen at about 5 PM.    Past medical history: Up-to-date per patient  Psx:  x 3, lap artemio  Social: non-smoker.  No alcohol.  Occasional THC.  SAHM.      The history is provided by the patient.         Review of External Medical Records:     Nursing Notes were reviewed.    REVIEW OF SYSTEMS    (2-9 systems for level 4, 10 or more for level 5)     Review of Systems   Respiratory:  Positive for cough and shortness of breath.    Cardiovascular:  Positive for chest pain.       Except as noted above the remainder of the review of systems was reviewed and negative.       PAST MEDICAL HISTORY     Past Medical History:   Diagnosis Date    Calculus of gallbladder without cholecystitis without obstruction 2020    Morbid obesity     Nausea & vomiting          SURGICAL HISTORY       Past Surgical History:   Procedure Laterality Date     SECTION

## 2024-11-07 NOTE — ED TRIAGE NOTES
Patient arrived POV to ED cc of shortness of breath, sore throat, vomiting, cough, and chills. Patient's family has had walking pneumonia for the past few weeks. A few days ago patient started feeling weak, had a cough, and chills. Patient noticed yesterday she started having chest pain, fever at home, and coughed up blood. Patient's temp 100.1 in triage, she did take ibuprofen today.     Patient is short of breath, diaphoretic, and endorsing chest pain in triage.

## 2024-11-08 LAB
ANION GAP SERPL CALC-SCNC: 6 MMOL/L (ref 2–12)
BACTERIA SPEC CULT: NORMAL
BACTERIA SPEC CULT: NORMAL
BASOPHILS # BLD: 0 K/UL (ref 0–0.1)
BASOPHILS NFR BLD: 0 % (ref 0–1)
BUN SERPL-MCNC: 4 MG/DL (ref 6–20)
BUN/CREAT SERPL: 6 (ref 12–20)
CALCIUM SERPL-MCNC: 8.5 MG/DL (ref 8.5–10.1)
CHLORIDE SERPL-SCNC: 104 MMOL/L (ref 97–108)
CO2 SERPL-SCNC: 28 MMOL/L (ref 21–32)
CREAT SERPL-MCNC: 0.68 MG/DL (ref 0.55–1.02)
DIFFERENTIAL METHOD BLD: ABNORMAL
EOSINOPHIL # BLD: 0 K/UL (ref 0–0.4)
EOSINOPHIL NFR BLD: 0 % (ref 0–7)
ERYTHROCYTE [DISTWIDTH] IN BLOOD BY AUTOMATED COUNT: 13.9 % (ref 11.5–14.5)
GLUCOSE BLD STRIP.AUTO-MCNC: 97 MG/DL (ref 65–117)
GLUCOSE SERPL-MCNC: 92 MG/DL (ref 65–100)
HCT VFR BLD AUTO: 33.4 % (ref 35–47)
HGB BLD-MCNC: 10.5 G/DL (ref 11.5–16)
IMM GRANULOCYTES # BLD AUTO: 0 K/UL (ref 0–0.04)
IMM GRANULOCYTES NFR BLD AUTO: 0 % (ref 0–0.5)
LYMPHOCYTES # BLD: 1.2 K/UL (ref 0.8–3.5)
LYMPHOCYTES NFR BLD: 21 % (ref 12–49)
MAGNESIUM SERPL-MCNC: 2 MG/DL (ref 1.6–2.4)
MCH RBC QN AUTO: 26.4 PG (ref 26–34)
MCHC RBC AUTO-ENTMCNC: 31.4 G/DL (ref 30–36.5)
MCV RBC AUTO: 84.1 FL (ref 80–99)
MONOCYTES # BLD: 0.5 K/UL (ref 0–1)
MONOCYTES NFR BLD: 8 % (ref 5–13)
NEUTS SEG # BLD: 4 K/UL (ref 1.8–8)
NEUTS SEG NFR BLD: 71 % (ref 32–75)
NRBC # BLD: 0 K/UL (ref 0–0.01)
NRBC BLD-RTO: 0 PER 100 WBC
PLATELET # BLD AUTO: 284 K/UL (ref 150–400)
PMV BLD AUTO: 9.7 FL (ref 8.9–12.9)
POTASSIUM SERPL-SCNC: 3.4 MMOL/L (ref 3.5–5.1)
RBC # BLD AUTO: 3.97 M/UL (ref 3.8–5.2)
SERVICE CMNT-IMP: NORMAL
SERVICE CMNT-IMP: NORMAL
SODIUM SERPL-SCNC: 138 MMOL/L (ref 136–145)
WBC # BLD AUTO: 5.7 K/UL (ref 3.6–11)

## 2024-11-08 PROCEDURE — 6370000000 HC RX 637 (ALT 250 FOR IP)

## 2024-11-08 PROCEDURE — 2580000003 HC RX 258: Performed by: FAMILY MEDICINE

## 2024-11-08 PROCEDURE — 82962 GLUCOSE BLOOD TEST: CPT

## 2024-11-08 PROCEDURE — 83735 ASSAY OF MAGNESIUM: CPT

## 2024-11-08 PROCEDURE — 87205 SMEAR GRAM STAIN: CPT

## 2024-11-08 PROCEDURE — 6370000000 HC RX 637 (ALT 250 FOR IP): Performed by: HOSPITALIST

## 2024-11-08 PROCEDURE — 36415 COLL VENOUS BLD VENIPUNCTURE: CPT

## 2024-11-08 PROCEDURE — 2060000000 HC ICU INTERMEDIATE R&B

## 2024-11-08 PROCEDURE — 2700000000 HC OXYGEN THERAPY PER DAY

## 2024-11-08 PROCEDURE — 87070 CULTURE OTHR SPECIMN AEROBIC: CPT

## 2024-11-08 PROCEDURE — 80048 BASIC METABOLIC PNL TOTAL CA: CPT

## 2024-11-08 PROCEDURE — 6370000000 HC RX 637 (ALT 250 FOR IP): Performed by: FAMILY MEDICINE

## 2024-11-08 PROCEDURE — 85025 COMPLETE CBC W/AUTO DIFF WBC: CPT

## 2024-11-08 PROCEDURE — 6360000002 HC RX W HCPCS: Performed by: FAMILY MEDICINE

## 2024-11-08 RX ORDER — TRAMADOL HYDROCHLORIDE 50 MG/1
50 TABLET ORAL
Status: COMPLETED | OUTPATIENT
Start: 2024-11-08 | End: 2024-11-08

## 2024-11-08 RX ORDER — POTASSIUM CHLORIDE 750 MG/1
40 TABLET, EXTENDED RELEASE ORAL ONCE
Status: COMPLETED | OUTPATIENT
Start: 2024-11-08 | End: 2024-11-08

## 2024-11-08 RX ORDER — TRAMADOL HYDROCHLORIDE 50 MG/1
50 TABLET ORAL EVERY 6 HOURS PRN
Status: DISCONTINUED | OUTPATIENT
Start: 2024-11-08 | End: 2024-11-09 | Stop reason: HOSPADM

## 2024-11-08 RX ADMIN — SODIUM CHLORIDE, PRESERVATIVE FREE 10 ML: 5 INJECTION INTRAVENOUS at 08:20

## 2024-11-08 RX ADMIN — GUAIFENESIN AND DEXTROMETHORPHAN 10 ML: 100; 10 SYRUP ORAL at 15:54

## 2024-11-08 RX ADMIN — GUAIFENESIN AND DEXTROMETHORPHAN 10 ML: 100; 10 SYRUP ORAL at 20:35

## 2024-11-08 RX ADMIN — POTASSIUM CHLORIDE 40 MEQ: 750 TABLET, EXTENDED RELEASE ORAL at 09:08

## 2024-11-08 RX ADMIN — WATER 1000 MG: 1 INJECTION INTRAMUSCULAR; INTRAVENOUS; SUBCUTANEOUS at 23:34

## 2024-11-08 RX ADMIN — TRAMADOL HYDROCHLORIDE 50 MG: 50 TABLET ORAL at 23:34

## 2024-11-08 RX ADMIN — GUAIFENESIN AND DEXTROMETHORPHAN 10 ML: 100; 10 SYRUP ORAL at 08:20

## 2024-11-08 RX ADMIN — GUAIFENESIN AND DEXTROMETHORPHAN 10 ML: 100; 10 SYRUP ORAL at 13:00

## 2024-11-08 RX ADMIN — ENOXAPARIN SODIUM 60 MG: 100 INJECTION SUBCUTANEOUS at 08:20

## 2024-11-08 RX ADMIN — SODIUM CHLORIDE, PRESERVATIVE FREE 10 ML: 5 INJECTION INTRAVENOUS at 20:37

## 2024-11-08 RX ADMIN — ACETAMINOPHEN 650 MG: 325 TABLET ORAL at 08:20

## 2024-11-08 RX ADMIN — ENOXAPARIN SODIUM 60 MG: 100 INJECTION SUBCUTANEOUS at 20:35

## 2024-11-08 RX ADMIN — AZITHROMYCIN MONOHYDRATE 250 MG: 500 INJECTION, POWDER, LYOPHILIZED, FOR SOLUTION INTRAVENOUS at 23:40

## 2024-11-08 RX ADMIN — IBUPROFEN 400 MG: 400 TABLET, FILM COATED ORAL at 00:22

## 2024-11-08 RX ADMIN — SODIUM CHLORIDE, PRESERVATIVE FREE 10 ML: 5 INJECTION INTRAVENOUS at 08:21

## 2024-11-08 RX ADMIN — TRAMADOL HYDROCHLORIDE 50 MG: 50 TABLET ORAL at 01:51

## 2024-11-08 RX ADMIN — SODIUM CHLORIDE, PRESERVATIVE FREE 10 ML: 5 INJECTION INTRAVENOUS at 20:36

## 2024-11-08 ASSESSMENT — PAIN DESCRIPTION - LOCATION
LOCATION: HEAD
LOCATION: ABDOMEN
LOCATION: HEAD

## 2024-11-08 ASSESSMENT — PAIN SCALES - GENERAL
PAINLEVEL_OUTOF10: 4
PAINLEVEL_OUTOF10: 6
PAINLEVEL_OUTOF10: 7
PAINLEVEL_OUTOF10: 3
PAINLEVEL_OUTOF10: 6

## 2024-11-08 ASSESSMENT — PAIN DESCRIPTION - DESCRIPTORS
DESCRIPTORS: ACHING
DESCRIPTORS: ACHING

## 2024-11-08 NOTE — PLAN OF CARE
Problem: Discharge Planning  Goal: Discharge to home or other facility with appropriate resources  11/8/2024 0248 by Genny Gold, RN  Outcome: Progressing  Flowsheets (Taken 11/7/2024 2000)  Discharge to home or other facility with appropriate resources: Identify barriers to discharge with patient and caregiver  11/7/2024 1618 by Abbi Kuo, RN  Outcome: Progressing     Problem: Pain  Goal: Verbalizes/displays adequate comfort level or baseline comfort level  Outcome: Progressing

## 2024-11-08 NOTE — PROGRESS NOTES
Bedside shift change report given to Cate RN (oncoming nurse) by Genny RN (offgoing nurse). Report included the following information Nurse Handoff Report.

## 2024-11-08 NOTE — CARE COORDINATION
Care Management Initial Assessment       RUR:8%  Readmission? No  1st IM letter given? No  1st  letter given: No     11/08/24 1148   Service Assessment   Patient Orientation Alert and Oriented   Cognition Alert   History Provided By Patient   Primary Caregiver Self   Support Systems Spouse/Significant Other   Patient's Healthcare Decision Maker is: Legal Next of Kin   PCP Verified by CM Yes  (NO PCP- does not want assistance in locating one)   Prior Functional Level Independent in ADLs/IADLs   Current Functional Level Independent in ADLs/IADLs   Can patient return to prior living arrangement Yes   Ability to make needs known: Good   Family able to assist with home care needs: Yes   Would you like for me to discuss the discharge plan with any other family members/significant others, and if so, who? Yes  (spouse)   Financial Resources Medicaid   Community Resources None   Social/Functional History   Lives With Spouse   Type of Home Apartment   Home Equipment None   Receives Help From Family   ADL Assistance Independent   Homemaking Assistance Independent   Homemaking Responsibilities Yes   Ambulation Assistance Independent   Transfer Assistance Independent   Active  No   Mode of Transportation Car   Occupation Unemployed   Discharge Planning   Type of Residence Apartment   Living Arrangements Spouse/Significant Other   Current Services Prior To Admission None   Potential Assistance Needed N/A   DME Ordered? No   Potential Assistance Purchasing Medications No   Type of Home Care Services None   Patient expects to be discharged to: Apartment   History of falls? 0   Services At/After Discharge   Transition of Care Consult (CM Consult) N/A   Services At/After Discharge None    Resource Information Provided? No   Mode of Transport at Discharge Other (see comment)  (tbd)   Confirm Follow Up Transport Other (see comment)  (tbd)     CM spoke with patient and verified demographics, insurance, and PCP.  Patient lives with her spouse. Patient is independent in her ADLs/IADLs and does not have any DME. Patient uses [facility] on [address]. Patient has no history of HH/SNF/IPR.          THOMAS Montoya   Care Manager  Available via [employer]

## 2024-11-08 NOTE — PLAN OF CARE
Problem: Discharge Planning  Goal: Discharge to home or other facility with appropriate resources  11/8/2024 0952 by Cate Coronado, RN  Outcome: Progressing  11/8/2024 0248 by Genny Gold, RN  Outcome: Progressing  Flowsheets (Taken 11/7/2024 2000)  Discharge to home or other facility with appropriate resources: Identify barriers to discharge with patient and caregiver     Problem: Pain  Goal: Verbalizes/displays adequate comfort level or baseline comfort level  11/8/2024 0952 by Cate Coronado, RN  Outcome: Progressing  Flowsheets (Taken 11/8/2024 0800)  Verbalizes/displays adequate comfort level or baseline comfort level: Encourage patient to monitor pain and request assistance  11/8/2024 0248 by Genny Gold, RN  Outcome: Progressing

## 2024-11-08 NOTE — PROGRESS NOTES
[facility]                                                                                          Hospitalist Progress Note  Cory Hernandez MD  Office Phone: [phone]        Date of Service:  2024  NAME:  Gab Powell  :  1990  MRN:  [id number]       Admission Summary:   34 y.o. female with a hx morbid obesity who presents with several days of cough, chills, and malaise, and is being admitted for pneumonia.        Interval history / Subjective:   Breathing better today, back on O2, dyspneic with minimal exertion     Assessment & Plan:     Rhinovirus URI  Bacterial pneumonia  Sepsis  Acute respiratory failure w hypoxia  -continue IV abx  -incentive spirometry  -f/u cultures (blood and sputum)  -wean O2  -needs outpt sleep study    Elevated BP without a hx of HTN  -monitor    Morbid obesity        Code status: full  Prophylaxis: sc Lovenox  Care Plan discussed with: pt  Anticipated Disposition:   Inpatient  Cardiac monitoring: Telemetry  Central Line:            Social Determinants of Health     Tobacco Use: Low Risk  (2021)    Received from [facility]  (prior to 2023)    Patient History     Smoking Tobacco Use: Never     Smokeless Tobacco Use: Never     Passive Exposure: Not on file   Alcohol Use: Not At Risk (2024)    AUDIT-C     Frequency of Alcohol Consumption: Monthly or less     Average Number of Drinks: 1 or 2     Frequency of Binge Drinking: Never   Financial Resource Strain: Not on file   Food Insecurity: No Food Insecurity (2024)    Hunger Vital Sign     Worried About Running Out of Food in the Last Year: Never true     Ran Out of Food in the Last Year: Never true   Transportation Needs: No Transportation Needs (2024)    PRAPARE - Transportation     Lack of Transportation (Medical): No     Lack of Transportation (Non-Medical): No   Physical Activity: Not on file   Stress: Not on file   Social  (EFFER-K) effervescent tablet 40 mEq  40 mEq Oral PRN    Or    potassium chloride 10 mEq/100 mL IVPB (Peripheral Line)  10 mEq IntraVENous PRN    magnesium sulfate 2000 mg in 50 mL IVPB premix  2,000 mg IntraVENous PRN    enoxaparin (LOVENOX) injection 60 mg  60 mg SubCUTAneous BID    ondansetron (ZOFRAN-ODT) disintegrating tablet 4 mg  4 mg Oral Q8H PRN    Or    ondansetron (ZOFRAN) injection 4 mg  4 mg IntraVENous Q6H PRN    polyethylene glycol (GLYCOLAX) packet 17 g  17 g Oral Daily PRN    acetaminophen (TYLENOL) tablet 650 mg  650 mg Oral Q6H PRN    Or    acetaminophen (TYLENOL) suppository 650 mg  650 mg Rectal Q6H PRN    ibuprofen (ADVIL;MOTRIN) tablet 400 mg  400 mg Oral Q6H PRN    cefTRIAXone (ROCEPHIN) 1,000 mg in sterile water 10 mL IV syringe  1,000 mg IntraVENous Q24H    azithromycin (ZITHROMAX) 250 mg in sodium chloride 0.9 % 250 mL IVPB  250 mg IntraVENous Q24H    sodium chloride flush 0.9 % injection 5-40 mL  5-40 mL IntraVENous 2 times per day    sodium chloride flush 0.9 % injection 5-40 mL  5-40 mL IntraVENous PRN    0.9 % sodium chloride infusion   IntraVENous PRN    guaiFENesin-dextromethorphan (ROBITUSSIN DM) 100-10 MG/5ML syrup 10 mL  10 mL Oral 4x daily     ______________________________________________________________________  EXPECTED LENGTH OF STAY: 4  ACTUAL LENGTH OF STAY:          1                 Cory Hernandez MD

## 2024-11-09 VITALS
TEMPERATURE: 97.9 F | HEART RATE: 80 BPM | SYSTOLIC BLOOD PRESSURE: 153 MMHG | BODY MASS INDEX: 47.09 KG/M2 | WEIGHT: 293 LBS | DIASTOLIC BLOOD PRESSURE: 72 MMHG | RESPIRATION RATE: 30 BRPM | OXYGEN SATURATION: 93 % | HEIGHT: 66 IN

## 2024-11-09 LAB
ANION GAP SERPL CALC-SCNC: 6 MMOL/L (ref 2–12)
BASOPHILS # BLD: 0 K/UL (ref 0–0.1)
BASOPHILS NFR BLD: 0 % (ref 0–1)
BUN SERPL-MCNC: 5 MG/DL (ref 6–20)
BUN/CREAT SERPL: 8 (ref 12–20)
CALCIUM SERPL-MCNC: 8.6 MG/DL (ref 8.5–10.1)
CHLORIDE SERPL-SCNC: 103 MMOL/L (ref 97–108)
CO2 SERPL-SCNC: 28 MMOL/L (ref 21–32)
CREAT SERPL-MCNC: 0.66 MG/DL (ref 0.55–1.02)
DIFFERENTIAL METHOD BLD: ABNORMAL
EOSINOPHIL # BLD: 0.1 K/UL (ref 0–0.4)
EOSINOPHIL NFR BLD: 2 % (ref 0–7)
ERYTHROCYTE [DISTWIDTH] IN BLOOD BY AUTOMATED COUNT: 13.8 % (ref 11.5–14.5)
GLUCOSE SERPL-MCNC: 88 MG/DL (ref 65–100)
HCT VFR BLD AUTO: 31.6 % (ref 35–47)
HGB BLD-MCNC: 10.1 G/DL (ref 11.5–16)
IMM GRANULOCYTES # BLD AUTO: 0 K/UL (ref 0–0.04)
IMM GRANULOCYTES NFR BLD AUTO: 0 % (ref 0–0.5)
LYMPHOCYTES # BLD: 1.3 K/UL (ref 0.8–3.5)
LYMPHOCYTES NFR BLD: 26 % (ref 12–49)
MAGNESIUM SERPL-MCNC: 2 MG/DL (ref 1.6–2.4)
MCH RBC QN AUTO: 26.6 PG (ref 26–34)
MCHC RBC AUTO-ENTMCNC: 32 G/DL (ref 30–36.5)
MCV RBC AUTO: 83.2 FL (ref 80–99)
MONOCYTES # BLD: 0.5 K/UL (ref 0–1)
MONOCYTES NFR BLD: 10 % (ref 5–13)
NEUTS SEG # BLD: 3.2 K/UL (ref 1.8–8)
NEUTS SEG NFR BLD: 62 % (ref 32–75)
NRBC # BLD: 0 K/UL (ref 0–0.01)
NRBC BLD-RTO: 0 PER 100 WBC
PLATELET # BLD AUTO: 273 K/UL (ref 150–400)
PMV BLD AUTO: 9.5 FL (ref 8.9–12.9)
POTASSIUM SERPL-SCNC: 3.7 MMOL/L (ref 3.5–5.1)
RBC # BLD AUTO: 3.8 M/UL (ref 3.8–5.2)
SODIUM SERPL-SCNC: 137 MMOL/L (ref 136–145)
WBC # BLD AUTO: 5.1 K/UL (ref 3.6–11)

## 2024-11-09 PROCEDURE — 85025 COMPLETE CBC W/AUTO DIFF WBC: CPT

## 2024-11-09 PROCEDURE — 36415 COLL VENOUS BLD VENIPUNCTURE: CPT

## 2024-11-09 PROCEDURE — 2580000003 HC RX 258: Performed by: FAMILY MEDICINE

## 2024-11-09 PROCEDURE — 83735 ASSAY OF MAGNESIUM: CPT

## 2024-11-09 PROCEDURE — 6360000002 HC RX W HCPCS: Performed by: FAMILY MEDICINE

## 2024-11-09 PROCEDURE — 6370000000 HC RX 637 (ALT 250 FOR IP): Performed by: HOSPITALIST

## 2024-11-09 PROCEDURE — 80048 BASIC METABOLIC PNL TOTAL CA: CPT

## 2024-11-09 PROCEDURE — 2700000000 HC OXYGEN THERAPY PER DAY

## 2024-11-09 RX ORDER — GUAIFENESIN 600 MG/1
600 TABLET, EXTENDED RELEASE ORAL 2 TIMES DAILY
Qty: 10 TABLET | Refills: 0 | Status: SHIPPED | OUTPATIENT
Start: 2024-11-09 | End: 2024-11-14

## 2024-11-09 RX ORDER — BENZONATATE 200 MG/1
200 CAPSULE ORAL 3 TIMES DAILY PRN
Qty: 21 CAPSULE | Refills: 0 | Status: SHIPPED | OUTPATIENT
Start: 2024-11-09 | End: 2024-11-16

## 2024-11-09 RX ORDER — LEVOFLOXACIN 750 MG/1
750 TABLET, FILM COATED ORAL DAILY
Qty: 4 TABLET | Refills: 0 | Status: SHIPPED | OUTPATIENT
Start: 2024-11-09 | End: 2024-11-13

## 2024-11-09 RX ADMIN — ENOXAPARIN SODIUM 60 MG: 100 INJECTION SUBCUTANEOUS at 09:20

## 2024-11-09 RX ADMIN — GUAIFENESIN AND DEXTROMETHORPHAN 10 ML: 100; 10 SYRUP ORAL at 09:20

## 2024-11-09 RX ADMIN — SODIUM CHLORIDE, PRESERVATIVE FREE 10 ML: 5 INJECTION INTRAVENOUS at 09:20

## 2024-11-09 ASSESSMENT — PAIN SCALES - GENERAL: PAINLEVEL_OUTOF10: 2

## 2024-11-09 NOTE — DISCHARGE INSTRUCTIONS
You were admitted and treated for rhinovirus upper respiratory infection and bacterial pneumonia. Complete the antibiotics as prescribed and follow-up with your PCP. You would benefit from an outpatient sleep study which your primary care provider can arrange.

## 2024-11-09 NOTE — PLAN OF CARE
Problem: Discharge Planning  Goal: Discharge to home or other facility with appropriate resources  11/9/2024 0249 by Missael He, RN  Outcome: Progressing     Problem: Pain  Goal: Verbalizes/displays adequate comfort level or baseline comfort level  11/9/2024 0249 by Missael He, RN  Outcome: Progressing

## 2024-11-09 NOTE — DISCHARGE SUMMARY
Discharge Summary       PATIENT ID: Gab Powell  MRN: [id number]   YOB: 1990    DATE OF ADMISSION: 11/6/2024  9:07 PM    DATE OF DISCHARGE: 11/9/2024   PRIMARY CARE PROVIDER: No primary care provider on file.     ATTENDING PHYSICIAN: David  DISCHARGING PROVIDER: Cory Hernandez MD    To contact this individual call [phone] and ask the  to page.  If unavailable ask to be transferred the Adult Hospitalist Department.    CONSULTATIONS: None    PROCEDURES/SURGERIES: * No surgery found *     ADMITTING DIAGNOSES & HOSPITAL COURSE:   Bacterial pneumonia  Acute respiratory failure w/ hypoxia  Sepsis  Rhinovirus URI  Elevated BP without a history of HTN  Morbid obesity    34 y.o. female with a hx morbid obesity who presents with several days of cough, chills, and malaise, +kids at home with URI symptoms. Found to have bacterial pneumonia here.    Rhinovirus PCR positive. Bacterial pneumonia radiographically, sepsis syndrome clinically, respiratory failure requiring 4L O2.    Treated w/ IV abx, supportive care. Blood cx NGTD, respiratory culture pending. IV ceftriaxone and azithromycin transitioned to PO levofloxacin. Would benefit from outpatient sleep study given body habitus.    DISCHARGE DIAGNOSES / PLAN:      PENDING TEST RESULTS:   At the time of discharge the following test results are still pending: finalization of all cultures    FOLLOW UP APPOINTMENTS:    Follow-up Information       Follow up With Specialties Details Why Contact Info    Primary care provider  Schedule an appointment as soon as possible for a visit in 1 week(s)                  DISCHARGE MEDICATIONS:     Medication List        START taking these medications      benzonatate 200 MG capsule  Commonly known as: TESSALON  Take 1 capsule by mouth 3 times daily as needed for Cough     guaiFENesin 600 MG extended release tablet  Commonly known as: MUCINEX  Take 1 tablet by mouth 2 times daily for 5 days     levoFLOXacin

## 2024-11-09 NOTE — PROGRESS NOTES
Discharge medications reviewed with the patient and spouse and appropriate educational materials and side effects teaching were provided.

## 2024-11-09 NOTE — PROGRESS NOTES
11/9/2024        RE: Gab Powell         [address] [facility] [address]          To Whom It May Concern,      Due to medical reasons, Gab Powell was admitted to [facility] from 11/6/2024 to 11/9/2024. Her  was required to stay home during this time to care for their children.        Sincerely,    Cory Hernandez MD  [address] [place] [address] [phone]

## 2024-11-10 LAB
BACTERIA SPEC CULT: NORMAL
GRAM STN SPEC: NORMAL
SERVICE CMNT-IMP: NORMAL

## 2024-11-12 LAB
BACTERIA SPEC CULT: NORMAL
BACTERIA SPEC CULT: NORMAL
SERVICE CMNT-IMP: NORMAL
SERVICE CMNT-IMP: NORMAL

## 2024-11-20 ENCOUNTER — HOSPITAL ENCOUNTER (EMERGENCY)
Facility: HOSPITAL | Age: 34
Discharge: HOME | End: 2024-11-20
Attending: STUDENT IN AN ORGANIZED HEALTH CARE EDUCATION/TRAINING PROGRAM
Payer: MEDICAID

## 2024-11-20 ENCOUNTER — APPOINTMENT (OUTPATIENT)
Facility: HOSPITAL | Age: 34
End: 2024-11-20
Payer: MEDICAID

## 2024-11-20 VITALS
TEMPERATURE: 98.4 F | HEIGHT: 66 IN | BODY MASS INDEX: 47.09 KG/M2 | WEIGHT: 293 LBS | DIASTOLIC BLOOD PRESSURE: 77 MMHG | OXYGEN SATURATION: 98 % | SYSTOLIC BLOOD PRESSURE: 145 MMHG | HEART RATE: 90 BPM | RESPIRATION RATE: 22 BRPM

## 2024-11-20 DIAGNOSIS — J18.9 PNEUMONIA OF RIGHT MIDDLE LOBE DUE TO INFECTIOUS ORGANISM: Primary | ICD-10-CM

## 2024-11-20 DIAGNOSIS — R05.1 ACUTE COUGH: ICD-10-CM

## 2024-11-20 LAB
ALBUMIN SERPL-MCNC: 2.9 G/DL (ref 3.5–5)
ALBUMIN/GLOB SERPL: 0.6 (ref 1.1–2.2)
ALP SERPL-CCNC: 68 U/L (ref 45–117)
ALT SERPL-CCNC: 29 U/L (ref 12–78)
ANION GAP SERPL CALC-SCNC: 3 MMOL/L (ref 2–12)
AST SERPL-CCNC: 33 U/L (ref 15–37)
BASOPHILS # BLD: 0 K/UL (ref 0–0.1)
BASOPHILS NFR BLD: 1 % (ref 0–1)
BILIRUB SERPL-MCNC: 0.4 MG/DL (ref 0.2–1)
BUN SERPL-MCNC: 7 MG/DL (ref 6–20)
BUN/CREAT SERPL: 10 (ref 12–20)
CALCIUM SERPL-MCNC: 8.9 MG/DL (ref 8.5–10.1)
CHLORIDE SERPL-SCNC: 108 MMOL/L (ref 97–108)
CO2 SERPL-SCNC: 27 MMOL/L (ref 21–32)
COMMENT:: NORMAL
CREAT SERPL-MCNC: 0.68 MG/DL (ref 0.55–1.02)
DIFFERENTIAL METHOD BLD: ABNORMAL
EKG ATRIAL RATE: 416 BPM
EKG DIAGNOSIS: NORMAL
EKG P AXIS: 46 DEGREES
EKG Q-T INTERVAL: 370 MS
EKG QRS DURATION: 90 MS
EKG QTC CALCULATION (BAZETT): 418 MS
EKG R AXIS: 56 DEGREES
EKG T AXIS: 4 DEGREES
EKG VENTRICULAR RATE: 77 BPM
EOSINOPHIL # BLD: 0.4 K/UL (ref 0–0.4)
EOSINOPHIL NFR BLD: 8 % (ref 0–7)
ERYTHROCYTE [DISTWIDTH] IN BLOOD BY AUTOMATED COUNT: 14.1 % (ref 11.5–14.5)
GLOBULIN SER CALC-MCNC: 5 G/DL (ref 2–4)
GLUCOSE SERPL-MCNC: 93 MG/DL (ref 65–100)
HCT VFR BLD AUTO: 33.6 % (ref 35–47)
HGB BLD-MCNC: 10.5 G/DL (ref 11.5–16)
IMM GRANULOCYTES # BLD AUTO: 0 K/UL (ref 0–0.04)
IMM GRANULOCYTES NFR BLD AUTO: 0 % (ref 0–0.5)
LYMPHOCYTES # BLD: 1.4 K/UL (ref 0.8–3.5)
LYMPHOCYTES NFR BLD: 27 % (ref 12–49)
MCH RBC QN AUTO: 26.6 PG (ref 26–34)
MCHC RBC AUTO-ENTMCNC: 31.3 G/DL (ref 30–36.5)
MCV RBC AUTO: 85.1 FL (ref 80–99)
MONOCYTES # BLD: 0.5 K/UL (ref 0–1)
MONOCYTES NFR BLD: 9 % (ref 5–13)
NEUTS SEG # BLD: 2.9 K/UL (ref 1.8–8)
NEUTS SEG NFR BLD: 55 % (ref 32–75)
NRBC # BLD: 0 K/UL (ref 0–0.01)
NRBC BLD-RTO: 0 PER 100 WBC
PLATELET # BLD AUTO: 357 K/UL (ref 150–400)
PMV BLD AUTO: 10.1 FL (ref 8.9–12.9)
POTASSIUM SERPL-SCNC: 4.4 MMOL/L (ref 3.5–5.1)
PROT SERPL-MCNC: 7.9 G/DL (ref 6.4–8.2)
RBC # BLD AUTO: 3.95 M/UL (ref 3.8–5.2)
SODIUM SERPL-SCNC: 138 MMOL/L (ref 136–145)
SPECIMEN HOLD: NORMAL
TROPONIN I SERPL HS-MCNC: 7 NG/L (ref 0–51)
WBC # BLD AUTO: 5.3 K/UL (ref 3.6–11)

## 2024-11-20 PROCEDURE — 93005 ELECTROCARDIOGRAM TRACING: CPT

## 2024-11-20 PROCEDURE — 96374 THER/PROPH/DIAG INJ IV PUSH: CPT

## 2024-11-20 PROCEDURE — 85025 COMPLETE CBC W/AUTO DIFF WBC: CPT

## 2024-11-20 PROCEDURE — 36415 COLL VENOUS BLD VENIPUNCTURE: CPT

## 2024-11-20 PROCEDURE — 6370000000 HC RX 637 (ALT 250 FOR IP)

## 2024-11-20 PROCEDURE — 71275 CT ANGIOGRAPHY CHEST: CPT

## 2024-11-20 PROCEDURE — 84484 ASSAY OF TROPONIN QUANT: CPT

## 2024-11-20 PROCEDURE — 6360000004 HC RX CONTRAST MEDICATION: Performed by: RADIOLOGY

## 2024-11-20 PROCEDURE — 99285 EMERGENCY DEPT VISIT HI MDM: CPT

## 2024-11-20 PROCEDURE — 6360000002 HC RX W HCPCS

## 2024-11-20 PROCEDURE — 80053 COMPREHEN METABOLIC PANEL: CPT

## 2024-11-20 RX ORDER — ONDANSETRON 2 MG/ML
4 INJECTION INTRAMUSCULAR; INTRAVENOUS ONCE
Status: COMPLETED | OUTPATIENT
Start: 2024-11-20 | End: 2024-11-20

## 2024-11-20 RX ORDER — IOPAMIDOL 755 MG/ML
100 INJECTION, SOLUTION INTRAVASCULAR
Status: COMPLETED | OUTPATIENT
Start: 2024-11-20 | End: 2024-11-20

## 2024-11-20 RX ORDER — PREDNISONE 50 MG/1
50 TABLET ORAL DAILY
Qty: 5 TABLET | Refills: 0 | Status: SHIPPED | OUTPATIENT
Start: 2024-11-20 | End: 2024-11-25

## 2024-11-20 RX ORDER — CODEINE PHOSPHATE AND GUAIFENESIN 10; 100 MG/5ML; MG/5ML
5 SOLUTION ORAL
Status: COMPLETED | OUTPATIENT
Start: 2024-11-20 | End: 2024-11-20

## 2024-11-20 RX ORDER — AZITHROMYCIN 250 MG/1
TABLET, FILM COATED ORAL
Qty: 6 TABLET | Refills: 0 | Status: SHIPPED | OUTPATIENT
Start: 2024-11-20 | End: 2024-11-30

## 2024-11-20 RX ADMIN — IOPAMIDOL 100 ML: 755 INJECTION, SOLUTION INTRAVENOUS at 15:47

## 2024-11-20 RX ADMIN — ONDANSETRON 4 MG: 2 INJECTION INTRAMUSCULAR; INTRAVENOUS at 15:58

## 2024-11-20 RX ADMIN — Medication 5 ML: at 16:09

## 2024-11-20 ASSESSMENT — PAIN DESCRIPTION - DESCRIPTORS: DESCRIPTORS: ACHING

## 2024-11-20 ASSESSMENT — PAIN DESCRIPTION - LOCATION: LOCATION: CHEST

## 2024-11-20 ASSESSMENT — PAIN DESCRIPTION - ORIENTATION: ORIENTATION: MID

## 2024-11-20 ASSESSMENT — PAIN SCALES - GENERAL: PAINLEVEL_OUTOF10: 6

## 2024-11-20 ASSESSMENT — ENCOUNTER SYMPTOMS: COUGH: 1

## 2024-11-20 NOTE — DISCHARGE INSTRUCTIONS
Complete the antibiotics as prescribed follow up with your PCP. Return for new or worsening symptoms

## 2024-11-20 NOTE — ED PROVIDER NOTES
of care.      Risk  OTC drugs.  Prescription drug management.            REASSESSMENT     ED Course as of 11/20/24 1953 Wed Nov 20, 2024   1530 EKG 12 Lead (Chest Pain)  ED EKG Interpretation:  Time: 3:31 PM  Rhythm: normal sinus rhythm; and regular . Rate (approx.): 77; Axis: normal; P wave: normal; QRS interval: normal ; ST/T wave: normal; Other findings: Normal.  EKG interpreted by Lolis Barron MD.  [CH]   1555 CBC with Auto Differential(!):    WBC 5.3   RBC 3.95   Hemoglobin Quant 10.5(!)   Hematocrit 33.6(!)   MCV 85.1   MCH 26.6   MCHC 31.3   RDW 14.1   Platelet Count 357   MPV 10.1   Nucleated Red Blood Cells 0.0   Nucleated Red Blood Cells 0.00   Neutrophils % 55   Lymphocyte % 27   Monocytes % 9   Eosinophils % 8(!)   Basophils % 1   Immature Granulocytes % 0   Neutrophils Absolute 2.9   Lymphocytes Absolute 1.4   Monocytes Absolute 0.5   Eosinophils Absolute 0.4   Basophils Absolute 0.0   Immature Granulocytes Absolute 0.0   Differential Type AUTOMATED  Anemia consistent with previous measures.  [TR]   1639 EKG 12 Lead (Chest Pain)  ED EKG Interpretation:  Time: 4:39 PM  Rhythm: normal sinus rhythm; and regular . Rate (approx.): 77; Axis: normal; P wave: normal; QRS interval: normal ; ST/T wave: normal; Other findings: normal.  EKG interpreted by Lolis Barron MD.  [CH]      ED Course User Index  [CH] Lolis Barron MD  [TR] Malina Hart, AMANDA           CONSULTS:  None    PROCEDURES:  Unless otherwise noted below, none     Procedures      FINAL IMPRESSION      1. Pneumonia of right middle lobe due to infectious organism    2. Acute cough          DISPOSITION/PLAN   DISPOSITION Decision To Discharge 11/20/2024 05:25:58 PM      PATIENT REFERRED TO:  [facility] EMERGENCY DEP  [address] [place] [id number] [phone]  Go to   If symptoms worsen    Your Primary Care Provider    Go in 4 days        DISCHARGE MEDICATIONS:  Discharge Medication List as of 11/20/2024  5:15 PM        START

## 2024-11-20 NOTE — ED TRIAGE NOTES
Patient arrives to ED reports continued cough and shortness of breath. Previously admitted for pneumonia, but reports coughing continues. + chest pain and headache with coughing

## 2025-02-03 SDOH — HEALTH STABILITY: PHYSICAL HEALTH: ON AVERAGE, HOW MANY MINUTES DO YOU ENGAGE IN EXERCISE AT THIS LEVEL?: 20 MIN

## 2025-02-03 SDOH — HEALTH STABILITY: PHYSICAL HEALTH: ON AVERAGE, HOW MANY DAYS PER WEEK DO YOU ENGAGE IN MODERATE TO STRENUOUS EXERCISE (LIKE A BRISK WALK)?: 1 DAY

## 2025-02-04 ENCOUNTER — OFFICE VISIT (OUTPATIENT)
Age: 35
End: 2025-02-04
Payer: MEDICAID

## 2025-02-04 VITALS
SYSTOLIC BLOOD PRESSURE: 141 MMHG | HEIGHT: 67 IN | RESPIRATION RATE: 16 BRPM | OXYGEN SATURATION: 98 % | WEIGHT: 293 LBS | TEMPERATURE: 98.4 F | DIASTOLIC BLOOD PRESSURE: 83 MMHG | HEART RATE: 95 BPM | BODY MASS INDEX: 45.99 KG/M2

## 2025-02-04 DIAGNOSIS — E66.813 CLASS 3 SEVERE OBESITY DUE TO EXCESS CALORIES WITHOUT SERIOUS COMORBIDITY WITH BODY MASS INDEX (BMI) GREATER THAN OR EQUAL TO 70 IN ADULT: ICD-10-CM

## 2025-02-04 DIAGNOSIS — E66.01 CLASS 3 SEVERE OBESITY DUE TO EXCESS CALORIES WITHOUT SERIOUS COMORBIDITY WITH BODY MASS INDEX (BMI) GREATER THAN OR EQUAL TO 70 IN ADULT: Primary | ICD-10-CM

## 2025-02-04 DIAGNOSIS — D64.9 ANEMIA, UNSPECIFIED TYPE: ICD-10-CM

## 2025-02-04 DIAGNOSIS — Z00.00 ADULT GENERAL MEDICAL EXAM: ICD-10-CM

## 2025-02-04 DIAGNOSIS — E66.01 CLASS 3 SEVERE OBESITY DUE TO EXCESS CALORIES WITHOUT SERIOUS COMORBIDITY WITH BODY MASS INDEX (BMI) GREATER THAN OR EQUAL TO 70 IN ADULT: ICD-10-CM

## 2025-02-04 DIAGNOSIS — R06.83 SNORING: ICD-10-CM

## 2025-02-04 DIAGNOSIS — E66.813 CLASS 3 SEVERE OBESITY DUE TO EXCESS CALORIES WITHOUT SERIOUS COMORBIDITY WITH BODY MASS INDEX (BMI) GREATER THAN OR EQUAL TO 70 IN ADULT: Primary | ICD-10-CM

## 2025-02-04 PROBLEM — J96.01 ACUTE HYPOXIC RESPIRATORY FAILURE: Status: RESOLVED | Noted: 2024-11-07 | Resolved: 2025-02-04

## 2025-02-04 PROBLEM — K80.20 CALCULUS OF GALLBLADDER WITHOUT CHOLECYSTITIS WITHOUT OBSTRUCTION: Status: RESOLVED | Noted: 2020-05-04 | Resolved: 2025-02-04

## 2025-02-04 PROBLEM — K80.20 CHOLELITHIASIS: Status: RESOLVED | Noted: 2020-05-08 | Resolved: 2025-02-04

## 2025-02-04 PROCEDURE — 99204 OFFICE O/P NEW MOD 45 MIN: CPT | Performed by: STUDENT IN AN ORGANIZED HEALTH CARE EDUCATION/TRAINING PROGRAM

## 2025-02-04 ASSESSMENT — PATIENT HEALTH QUESTIONNAIRE - PHQ9
SUM OF ALL RESPONSES TO PHQ QUESTIONS 1-9: 0
SUM OF ALL RESPONSES TO PHQ QUESTIONS 1-9: 0
1. LITTLE INTEREST OR PLEASURE IN DOING THINGS: NOT AT ALL
SUM OF ALL RESPONSES TO PHQ9 QUESTIONS 1 & 2: 0
SUM OF ALL RESPONSES TO PHQ QUESTIONS 1-9: 0
2. FEELING DOWN, DEPRESSED OR HOPELESS: NOT AT ALL
SUM OF ALL RESPONSES TO PHQ QUESTIONS 1-9: 0

## 2025-02-04 NOTE — PROGRESS NOTES
Verified name and birth date for privacy precautions.   Chart reviewed in preparation for today's visit.     Chief Complaint   Patient presents with    New Patient          Health Maintenance Due   Topic    Depression Screen     Varicella vaccine (1 of 2 - 13+ 2-dose series)    HIV screen     Hepatitis C screen     Hepatitis B vaccine (2 of 3 - 19+ 3-dose series)    Cervical cancer screen     Flu vaccine (1)    COVID-19 Vaccine (3 - 2023-24 season)         Wt Readings from Last 3 Encounters:   02/04/25 (!) 200.5 kg (442 lb)   11/20/24 (!) 201.1 kg (443 lb 5.5 oz)   11/09/24 (!) 203 kg (447 lb 8.5 oz)     Temp Readings from Last 3 Encounters:   02/04/25 98.4 °F (36.9 °C)   11/20/24 98.4 °F (36.9 °C) (Oral)   11/09/24 97.9 °F (36.6 °C) (Oral)     BP Readings from Last 3 Encounters:   02/04/25 (!) 141/83   11/20/24 (!) 145/77   11/09/24 (!) 153/72     Pulse Readings from Last 3 Encounters:   02/04/25 95   11/20/24 90   11/09/24 80       Social Determinants of Health     Tobacco Use: Low Risk  (2/4/2025)    Patient History     Smoking Tobacco Use: Never     Smokeless Tobacco Use: Never     Passive Exposure: Not on file   Alcohol Use: Not At Risk (11/6/2024)    AUDIT-C     Frequency of Alcohol Consumption: Monthly or less     Average Number of Drinks: 1 or 2     Frequency of Binge Drinking: Never   Financial Resource Strain: Not on file   Food Insecurity: No Food Insecurity (11/7/2024)    Hunger Vital Sign     Worried About Running Out of Food in the Last Year: Never true     Ran Out of Food in the Last Year: Never true   Transportation Needs: No Transportation Needs (11/7/2024)    PRAPARE - Transportation     Lack of Transportation (Medical): No     Lack of Transportation (Non-Medical): No   Physical Activity: Insufficiently Active (2/3/2025)    Exercise Vital Sign     Days of Exercise per Week: 1 day     Minutes of Exercise per Session: 20 min   Stress: Not on file   Social Connections: Not on file   Intimate Partner

## 2025-02-05 LAB
ALBUMIN SERPL-MCNC: 4.1 G/DL (ref 3.9–4.9)
ALP SERPL-CCNC: 75 IU/L (ref 44–121)
ALT SERPL-CCNC: 18 IU/L (ref 0–32)
AST SERPL-CCNC: 17 IU/L (ref 0–40)
BILIRUB SERPL-MCNC: 0.3 MG/DL (ref 0–1.2)
BUN SERPL-MCNC: 6 MG/DL (ref 6–20)
BUN/CREAT SERPL: 10 (ref 9–23)
CALCIUM SERPL-MCNC: 9.1 MG/DL (ref 8.7–10.2)
CHLORIDE SERPL-SCNC: 101 MMOL/L (ref 96–106)
CHOLEST SERPL-MCNC: 167 MG/DL (ref 100–199)
CO2 SERPL-SCNC: 22 MMOL/L (ref 20–29)
CREAT SERPL-MCNC: 0.61 MG/DL (ref 0.57–1)
EGFRCR SERPLBLD CKD-EPI 2021: 119 ML/MIN/1.73
ERYTHROCYTE [DISTWIDTH] IN BLOOD BY AUTOMATED COUNT: 14 % (ref 11.7–15.4)
FERRITIN SERPL-MCNC: 51 NG/ML (ref 15–150)
GLOBULIN SER CALC-MCNC: 3.2 G/DL (ref 1.5–4.5)
GLUCOSE SERPL-MCNC: 98 MG/DL (ref 70–99)
HBA1C MFR BLD: 5.2 % (ref 4.8–5.6)
HCT VFR BLD AUTO: 35.7 % (ref 34–46.6)
HDLC SERPL-MCNC: 55 MG/DL
HGB BLD-MCNC: 11.5 G/DL (ref 11.1–15.9)
IRON SATN MFR SERPL: 14 % (ref 15–55)
IRON SERPL-MCNC: 51 UG/DL (ref 27–159)
LDLC SERPL CALC-MCNC: 95 MG/DL (ref 0–99)
MCH RBC QN AUTO: 27 PG (ref 26.6–33)
MCHC RBC AUTO-ENTMCNC: 32.2 G/DL (ref 31.5–35.7)
MCV RBC AUTO: 84 FL (ref 79–97)
PLATELET # BLD AUTO: 329 X10E3/UL (ref 150–450)
POTASSIUM SERPL-SCNC: 4.1 MMOL/L (ref 3.5–5.2)
PROT SERPL-MCNC: 7.3 G/DL (ref 6–8.5)
RBC # BLD AUTO: 4.26 X10E6/UL (ref 3.77–5.28)
SODIUM SERPL-SCNC: 139 MMOL/L (ref 134–144)
TIBC SERPL-MCNC: 376 UG/DL (ref 250–450)
TRIGL SERPL-MCNC: 95 MG/DL (ref 0–149)
TSH SERPL DL<=0.005 MIU/L-ACNC: 1.05 UIU/ML (ref 0.45–4.5)
UIBC SERPL-MCNC: 325 UG/DL (ref 131–425)
VLDLC SERPL CALC-MCNC: 17 MG/DL (ref 5–40)
WBC # BLD AUTO: 5.7 X10E3/UL (ref 3.4–10.8)

## (undated) DEVICE — STERILE POLYISOPRENE POWDER-FREE SURGICAL GLOVES WITH EMOLLIENT COATING: Brand: PROTEXIS

## (undated) DEVICE — SUTURE SZ 0 27IN 5/8 CIR UR-6  TAPER PT VIOLET ABSRB VICRYL J603H

## (undated) DEVICE — AGENT HEMSTAT 3GM PURIFIED PLNT STARCH PWD ABSRB ARISTA AH

## (undated) DEVICE — SUTURE MCRYL SZ 3-0 L27IN ABSRB UD L19MM PS-2 3/8 CIR PRIM Y427H

## (undated) DEVICE — APPLICATOR SURG L38CM RIG ATOMIZING SGL USE XL-R FLEXITIP

## (undated) DEVICE — STRAP,POSITIONING,KNEE/BODY,FOAM,4X60": Brand: MEDLINE

## (undated) DEVICE — DRAPE,REIN 53X77,STERILE: Brand: MEDLINE

## (undated) DEVICE — LAPAROSCOPIC TROCAR SLEEVE/SINGLE USE: Brand: KII® OPTICAL ACCESS SYSTEM

## (undated) DEVICE — TUBING, SUCTION, 1/4" X 12', STRAIGHT: Brand: MEDLINE

## (undated) DEVICE — TROCAR: Brand: KII® OPTICAL ACCESS SYSTEM

## (undated) DEVICE — BAG SPEC REM 224ML W4XL6IN DIA10MM 1 HND GYN DISP ENDOPCH

## (undated) DEVICE — CONTAINER,SPECIMEN,4OZ,OR STRL: Brand: MEDLINE

## (undated) DEVICE — APPLIER CLP M L L11.4IN DIA10MM ENDOSCP ROT MULT FOR LIG

## (undated) DEVICE — DRAPE,UTILTY,TAPE,15X26, 4EA/PK: Brand: MEDLINE

## (undated) DEVICE — DERMABOND SKIN ADH 0.7ML -- DERMABOND ADVANCED 12/BX

## (undated) DEVICE — (D)PREP SKN CHLRAPRP APPL 26ML -- CONVERT TO ITEM 371833

## (undated) DEVICE — Device

## (undated) DEVICE — SYR 20ML LL STRL LF --

## (undated) DEVICE — SYR 50ML LR LCK 1ML GRAD NSAF --

## (undated) DEVICE — APPLICATOR SURG XL L38CM FOR ARISTA ABSRB HEMSTAT FLEXITIP

## (undated) DEVICE — NEEDLE HYPO 25GA L1.5IN BVL ORIENTED ECLIPSE

## (undated) DEVICE — CLICKLINE SCISSORS INSERT: Brand: CLICKLINE

## (undated) DEVICE — DRAPE XR C ARM 41X74IN LF --

## (undated) DEVICE — DISSECTOR LAP DIA5MM BLNT TIP ENDOPATH

## (undated) DEVICE — FILTER SMK EVAC FLO CLR MEGADYNE

## (undated) DEVICE — AIR SHEET,LAT,COMFORT GLIDE, BLEND 40X80: Brand: MEDLINE

## (undated) DEVICE — ELECTRODE ES 36CM LAP FLAT L HK COAT DISP CLEANCOAT

## (undated) DEVICE — STOPCOCK IV 3W --

## (undated) DEVICE — SUTURE MCRYL SZ 4-0 L27IN ABSRB UD L19MM PS-2 1/2 CIR PRIM Y426H

## (undated) DEVICE — REM POLYHESIVE ADULT PATIENT RETURN ELECTRODE: Brand: VALLEYLAB

## (undated) DEVICE — INFECTION CONTROL KIT SYS

## (undated) DEVICE — TROCAR: Brand: KII® SLEEVE

## (undated) DEVICE — GARMENT,MEDLINE,DVT,INT,CALF,LG, GEN2: Brand: MEDLINE

## (undated) DEVICE — SURGICAL PROCEDURE KIT GEN LAPAROSCOPY LF